# Patient Record
Sex: FEMALE | Race: WHITE | NOT HISPANIC OR LATINO
[De-identification: names, ages, dates, MRNs, and addresses within clinical notes are randomized per-mention and may not be internally consistent; named-entity substitution may affect disease eponyms.]

---

## 2017-09-08 ENCOUNTER — TRANSCRIPTION ENCOUNTER (OUTPATIENT)
Age: 82
End: 2017-09-08

## 2017-09-08 ENCOUNTER — INPATIENT (INPATIENT)
Facility: HOSPITAL | Age: 82
LOS: 0 days | Discharge: HOME | DRG: 310 | End: 2017-09-08
Attending: INTERNAL MEDICINE | Admitting: HOSPITALIST
Payer: MEDICAID

## 2017-09-08 VITALS
HEIGHT: 63 IN | HEART RATE: 133 BPM | WEIGHT: 80.03 LBS | SYSTOLIC BLOOD PRESSURE: 127 MMHG | OXYGEN SATURATION: 98 % | TEMPERATURE: 98 F | DIASTOLIC BLOOD PRESSURE: 80 MMHG | RESPIRATION RATE: 16 BRPM

## 2017-09-08 VITALS
TEMPERATURE: 98 F | HEART RATE: 86 BPM | DIASTOLIC BLOOD PRESSURE: 72 MMHG | OXYGEN SATURATION: 94 % | SYSTOLIC BLOOD PRESSURE: 120 MMHG | RESPIRATION RATE: 20 BRPM

## 2017-09-08 DIAGNOSIS — I48.91 UNSPECIFIED ATRIAL FIBRILLATION: ICD-10-CM

## 2017-09-08 DIAGNOSIS — Z96.659 PRESENCE OF UNSPECIFIED ARTIFICIAL KNEE JOINT: Chronic | ICD-10-CM

## 2017-09-08 DIAGNOSIS — E03.9 HYPOTHYROIDISM, UNSPECIFIED: ICD-10-CM

## 2017-09-08 DIAGNOSIS — Z98.890 OTHER SPECIFIED POSTPROCEDURAL STATES: Chronic | ICD-10-CM

## 2017-09-08 DIAGNOSIS — M19.90 UNSPECIFIED OSTEOARTHRITIS, UNSPECIFIED SITE: ICD-10-CM

## 2017-09-08 DIAGNOSIS — Z96.649 PRESENCE OF UNSPECIFIED ARTIFICIAL HIP JOINT: Chronic | ICD-10-CM

## 2017-09-08 LAB
ALBUMIN SERPL ELPH-MCNC: 3.5 G/DL — SIGNIFICANT CHANGE UP (ref 3.3–5.2)
ALP SERPL-CCNC: 73 U/L — SIGNIFICANT CHANGE UP (ref 40–120)
ALT FLD-CCNC: 20 U/L — SIGNIFICANT CHANGE UP
ANION GAP SERPL CALC-SCNC: 15 MMOL/L — SIGNIFICANT CHANGE UP (ref 5–17)
APTT BLD: 36.4 SEC — SIGNIFICANT CHANGE UP (ref 27.5–37.4)
AST SERPL-CCNC: 24 U/L — SIGNIFICANT CHANGE UP
BASOPHILS NFR BLD AUTO: 1 % — SIGNIFICANT CHANGE UP (ref 0–2)
BILIRUB SERPL-MCNC: 0.6 MG/DL — SIGNIFICANT CHANGE UP (ref 0.4–2)
BUN SERPL-MCNC: 21 MG/DL — HIGH (ref 8–20)
CALCIUM SERPL-MCNC: 8.7 MG/DL — SIGNIFICANT CHANGE UP (ref 8.6–10.2)
CHLORIDE SERPL-SCNC: 95 MMOL/L — LOW (ref 98–107)
CO2 SERPL-SCNC: 24 MMOL/L — SIGNIFICANT CHANGE UP (ref 22–29)
CREAT SERPL-MCNC: 0.73 MG/DL — SIGNIFICANT CHANGE UP (ref 0.5–1.3)
EOSINOPHIL NFR BLD AUTO: 2 % — SIGNIFICANT CHANGE UP (ref 0–5)
GLUCOSE SERPL-MCNC: 155 MG/DL — HIGH (ref 70–115)
HCT VFR BLD CALC: 38.3 % — SIGNIFICANT CHANGE UP (ref 37–47)
HGB BLD-MCNC: 12.9 G/DL — SIGNIFICANT CHANGE UP (ref 12–16)
LYMPHOCYTES # BLD AUTO: 4 % — LOW (ref 20–55)
MCHC RBC-ENTMCNC: 32.1 PG — HIGH (ref 27–31)
MCHC RBC-ENTMCNC: 33.7 G/DL — SIGNIFICANT CHANGE UP (ref 32–36)
MCV RBC AUTO: 95.3 FL — SIGNIFICANT CHANGE UP (ref 81–99)
MONOCYTES NFR BLD AUTO: 10 % — SIGNIFICANT CHANGE UP (ref 3–10)
NEUTROPHILS NFR BLD AUTO: 77 % — HIGH (ref 37–73)
NEUTS BAND # BLD: 2 % — SIGNIFICANT CHANGE UP (ref 0–8)
PLAT MORPH BLD: SIGNIFICANT CHANGE UP
PLATELET # BLD AUTO: 258 K/UL — SIGNIFICANT CHANGE UP (ref 150–400)
POTASSIUM SERPL-MCNC: 3.8 MMOL/L — SIGNIFICANT CHANGE UP (ref 3.5–5.3)
POTASSIUM SERPL-SCNC: 3.8 MMOL/L — SIGNIFICANT CHANGE UP (ref 3.5–5.3)
PROT SERPL-MCNC: 6.8 G/DL — SIGNIFICANT CHANGE UP (ref 6.6–8.7)
RBC # BLD: 4.02 M/UL — LOW (ref 4.4–5.2)
RBC # FLD: 13 % — SIGNIFICANT CHANGE UP (ref 11–15.6)
RBC BLD AUTO: NORMAL — SIGNIFICANT CHANGE UP
SODIUM SERPL-SCNC: 134 MMOL/L — LOW (ref 135–145)
T4 AB SER-ACNC: 5.4 UG/DL — SIGNIFICANT CHANGE UP (ref 4.5–12)
TSH SERPL-MCNC: 5.29 UIU/ML — HIGH (ref 0.27–4.2)
VARIANT LYMPHS # BLD: 4 % — SIGNIFICANT CHANGE UP (ref 0–6)
WBC # BLD: 6.9 K/UL — SIGNIFICANT CHANGE UP (ref 4.8–10.8)
WBC # FLD AUTO: 6.9 K/UL — SIGNIFICANT CHANGE UP (ref 4.8–10.8)

## 2017-09-08 PROCEDURE — 99239 HOSP IP/OBS DSCHRG MGMT >30: CPT

## 2017-09-08 PROCEDURE — 99291 CRITICAL CARE FIRST HOUR: CPT

## 2017-09-08 PROCEDURE — 99223 1ST HOSP IP/OBS HIGH 75: CPT

## 2017-09-08 PROCEDURE — 99053 MED SERV 10PM-8AM 24 HR FAC: CPT

## 2017-09-08 PROCEDURE — 93010 ELECTROCARDIOGRAM REPORT: CPT

## 2017-09-08 RX ORDER — RIVAROXABAN 15 MG-20MG
20 KIT ORAL EVERY 24 HOURS
Qty: 0 | Refills: 0 | Status: DISCONTINUED | OUTPATIENT
Start: 2017-09-08 | End: 2017-09-08

## 2017-09-08 RX ORDER — ENOXAPARIN SODIUM 100 MG/ML
40 INJECTION SUBCUTANEOUS ONCE
Qty: 0 | Refills: 0 | Status: COMPLETED | OUTPATIENT
Start: 2017-09-08 | End: 2017-09-08

## 2017-09-08 RX ORDER — RIVAROXABAN 15 MG-20MG
1 KIT ORAL
Qty: 30 | Refills: 0
Start: 2017-09-08 | End: 2017-10-08

## 2017-09-08 RX ORDER — DILTIAZEM HCL 120 MG
1 CAPSULE, EXT RELEASE 24 HR ORAL
Qty: 30 | Refills: 0 | OUTPATIENT
Start: 2017-09-08 | End: 2017-10-08

## 2017-09-08 RX ORDER — SODIUM CHLORIDE 9 MG/ML
3 INJECTION INTRAMUSCULAR; INTRAVENOUS; SUBCUTANEOUS ONCE
Qty: 0 | Refills: 0 | Status: COMPLETED | OUTPATIENT
Start: 2017-09-08 | End: 2017-09-08

## 2017-09-08 RX ORDER — LEVOTHYROXINE SODIUM 125 MCG
1 TABLET ORAL
Qty: 30 | Refills: 0
Start: 2017-09-08 | End: 2017-10-08

## 2017-09-08 RX ORDER — RIVAROXABAN 15 MG-20MG
1 KIT ORAL
Qty: 30 | Refills: 0 | OUTPATIENT
Start: 2017-09-08 | End: 2017-10-08

## 2017-09-08 RX ORDER — ASPIRIN/CALCIUM CARB/MAGNESIUM 324 MG
0 TABLET ORAL
Qty: 0 | Refills: 0 | COMMUNITY

## 2017-09-08 RX ORDER — RIVAROXABAN 15 MG-20MG
1 KIT ORAL
Qty: 0 | Refills: 0 | COMMUNITY
Start: 2017-09-08

## 2017-09-08 RX ORDER — LEVOTHYROXINE SODIUM 125 MCG
1 TABLET ORAL
Qty: 30 | Refills: 0 | OUTPATIENT
Start: 2017-09-08 | End: 2017-10-08

## 2017-09-08 RX ORDER — ASPIRIN/CALCIUM CARB/MAGNESIUM 324 MG
1 TABLET ORAL
Qty: 30 | Refills: 0 | OUTPATIENT
Start: 2017-09-08 | End: 2017-10-08

## 2017-09-08 RX ORDER — SODIUM CHLORIDE 9 MG/ML
2000 INJECTION INTRAMUSCULAR; INTRAVENOUS; SUBCUTANEOUS ONCE
Qty: 0 | Refills: 0 | Status: COMPLETED | OUTPATIENT
Start: 2017-09-08 | End: 2017-09-08

## 2017-09-08 RX ORDER — DILTIAZEM HCL 120 MG
1 CAPSULE, EXT RELEASE 24 HR ORAL
Qty: 30 | Refills: 0
Start: 2017-09-08 | End: 2017-10-08

## 2017-09-08 RX ADMIN — RIVAROXABAN 20 MILLIGRAM(S): KIT at 18:44

## 2017-09-08 RX ADMIN — SODIUM CHLORIDE 3 MILLILITER(S): 9 INJECTION INTRAMUSCULAR; INTRAVENOUS; SUBCUTANEOUS at 04:33

## 2017-09-08 RX ADMIN — Medication 750 MILLIGRAM(S): at 11:29

## 2017-09-08 RX ADMIN — ENOXAPARIN SODIUM 40 MILLIGRAM(S): 100 INJECTION SUBCUTANEOUS at 05:32

## 2017-09-08 RX ADMIN — SODIUM CHLORIDE 2000 MILLILITER(S): 9 INJECTION INTRAMUSCULAR; INTRAVENOUS; SUBCUTANEOUS at 04:56

## 2017-09-08 RX ADMIN — Medication 750 MILLIGRAM(S): at 12:32

## 2017-09-08 NOTE — DISCHARGE NOTE ADULT - MEDICATION SUMMARY - MEDICATIONS TO TAKE
I will START or STAY ON the medications listed below when I get home from the hospital:    naproxen 250 mg oral tablet  -- 1 tab(s) by mouth every 8 hours, As needed, Hand Pain  -- Indication: For Arthritis    Cardizem  mg/24 hours oral capsule, extended release  -- 1 cap(s) by mouth once a day  -- It is very important that you take or use this exactly as directed.  Do not skip doses or discontinue unless directed by your doctor.  Some non-prescription drugs may aggravate your condition.  Read all labels carefully.  If a warning appears, check with your doctor before taking.  Swallow whole.  Do not crush.    -- Indication: For Atrial fibrillation    rivaroxaban 20 mg oral tablet  -- 1 tab(s) by mouth once a day  -- Check with your doctor before becoming pregnant.  It is very important that you take or use this exactly as directed.  Do not skip doses or discontinue unless directed by your doctor.  Obtain medical advice before taking any non-prescription drugs as some may affect the action of this medication.  Take with food.    -- Indication: For Atrial fibrillation    Synthroid 50 mcg (0.05 mg) oral tablet  -- 1 tab(s) by mouth once a day  -- It is very important that you take or use this exactly as directed.  Do not skip doses or discontinue unless directed by your doctor.  Medication should be taken with plenty of water.  Some non-prescription drugs may aggravate your condition.  Read all labels carefully.  If a warning appears, check with your doctor before taking.  Take medication on an empty stomach 1 hour before or 2 to 3 hours after a meal unless otherwise directed by your doctor.    -- Indication: For Hypothyroidism, unspecified type

## 2017-09-08 NOTE — ED ADULT NURSE REASSESSMENT NOTE - REASSESS COMMUNICATION
assumed care of pt awake isn stretcher daughter at bedside, pt only complaint is of pain to fingers, as per family pt does not have any real medical hx except complaint of pain to fingers, fingers are swollen,,pt to be admitted for a-fib, as per previous staff, pt came in with rapid afib and resolved after medicated.

## 2017-09-08 NOTE — DISCHARGE NOTE ADULT - PATIENT PORTAL LINK FT
“You can access the Follow[facility] Patient Portal, offered by [facility], by registering with the following website: [email or web address]”

## 2017-09-08 NOTE — ED ADULT NURSE NOTE - OBJECTIVE STATEMENT
Pt a&ox3 BIBA c.o pain to Right hand and neck, pt family @ bedside reports they called EMS because pt became weak and diaphoretic with labored breathing. RR even and unlabored @ this time, rapid a-fib on cm, vitals per flowsheet, Pt a&ox3 BIBA c.o pain to Right hand and neck, pt family @ bedside reports they called EMS because pt became weak and diaphoretic with labored breathing @ home this AM. RR even and unlabored @ this time, lungs CTA diminished beena. [place], Rapid a-fib on cm, vitals per flowsheet. MAEx4,  #20G iv noted to Right AC, site asymp. Pt with hx of arthritis, contracture to Left shoulder per baseline, per family @ bedside pt cannot lift Left arm above head. Pt Updated on POC, in no apparent distress, will continue to monitor and reassess

## 2017-09-08 NOTE — H&P ADULT - NSHPPHYSICALEXAM_GEN_ALL_CORE
GEN - appears age appropriate. pleasant. no distress.   HEENT - NCAT, EOMI, PAM  RESP - CTA BL, no wheeze/stridor/rhonchi/crackles. not on supplemental O2.  CARDIO - NS1S2, RRR. No murmurs/rubs/gallops.  ABD - Soft/Non tender/Non distended. Normal BS x4 quadrants.   Ext - No ELIJAH. no signs of venous/arterial stasis ulcers  MSK - full ROM of BL upper and lower extremities without pain or restriction. BL 5/5 strength on upper and lower extremities. joint erythema without warmth or tenderness noted at the DIP of the rt 2nd and 3rd digit. Bouchards and heberdens nodes appreciated BL.   Neuro - cn 2-12 grossly intact. no visible seizure activity appreciated. no tremor. gait not observed.   Skin - clean, dry, intact. no rashes or lesions.    Psych- AAOx3. no suicidal/homicidal ideation. appropriate behaviour. attentive. normal affect.

## 2017-09-08 NOTE — ED PROVIDER NOTE - OBJECTIVE STATEMENT
87 YO F WITH SOB., GENERAL MALAISE. SX ARE CONSTANT FOR LAST FEW DAYS. PT SX STARTED SLOWLY AND GOT WORSE. NO H/O SIMILAR SX. PT DENIES N/V. NP CP

## 2017-09-08 NOTE — ED ADULT NURSE NOTE - CHIEF COMPLAINT QUOTE
patient arrives via ambulance - states she has been complaining of hand and arm pain from her arthritis and is now complaining of neck pain at this time. emilia is hard of hearing

## 2017-09-08 NOTE — ED PROVIDER NOTE - PHYSICAL EXAMINATION
VS: reviewed in triage note...  HEENT: NC/AT , dry membranes  CV:irregularly irregular rate  Lungs: cta b/l, no r/r/w  Abd: soft, nt/nd, +bs  EXT: no c/c/e  Neuro:no focal defecits  skin: no rash  Pulses: dpp, pt 2+ b/l

## 2017-09-08 NOTE — DISCHARGE NOTE ADULT - PLAN OF CARE
control new heart condition start taking Cardizem 120mg daily for heart rhythm control  start taking Xarelto 20mg daily to help prevent having a stroke  Follow up with the cardiologist within 1-2 weeks for a follow up Pain Control Take Naproxen as needed for Pain  Follow up with your PMD, you can consider having an XRay of the hand done and some blood work to evaluate gout at that time pain control as above for Arthritis control of thyroid function Start taking Synthroid 50mcg by mouth daily  Repeat TSH in 6 weeks and have your PMD review the results to change dose as needed

## 2017-09-08 NOTE — DISCHARGE NOTE ADULT - CARE PLAN
Principal Discharge DX:	Atrial fibrillation with rapid ventricular response  Goal:	control new heart condition  Instructions for follow-up, activity and diet:	start taking Cardizem 120mg daily for heart rhythm control  start taking Xarelto 20mg daily to help prevent having a stroke  Follow up with the cardiologist within 1-2 weeks for a follow up  Secondary Diagnosis:	Arthritis  Goal:	Pain Control  Instructions for follow-up, activity and diet:	Take Naproxen as needed for Pain  Follow up with your PMD, you can consider having an XRay of the hand done and some blood work to evaluate gout at that time  Secondary Diagnosis:	Joint contracture  Goal:	pain control  Instructions for follow-up, activity and diet:	as above for Arthritis  Secondary Diagnosis:	Hypothyroidism, unspecified type  Goal:	control of thyroid function  Instructions for follow-up, activity and diet:	Start taking Synthroid 50mcg by mouth daily  Repeat TSH in 6 weeks and have your PMD review the results to change dose as needed

## 2017-09-08 NOTE — H&P ADULT - PROBLEM SELECTOR PLAN 3
- acute flare on right digit of pain  - believe that the patient may have had mild trauma that she does not remember  - gout unlikely  - pseudogout is also a possibility  PLAN  - naproxen PRN for pain  - will not check urate now because it is often falsely low in acute gout flares anyway, should be repeated as an outpatient when she is well to help establish a dx  - she may also consider XR hand as an outpatient for further eval

## 2017-09-08 NOTE — H&P ADULT - NSHPATTENDINGPLANDISCUSS_GEN_ALL_CORE
the patient and her daughter.  All imaging and results of lab/other studies reviewed by me. All questions answered to the satisfaction of the patient. At this time she agrees with the current plan of therapy.

## 2017-09-08 NOTE — H&P ADULT - FAMILY HISTORY
Father  Still living? Unknown  Family history of myocardial infarction, Age at diagnosis: Age Unknown     Mother  Still living? Unknown  Family history of myocardial infarction, Age at diagnosis: Age Unknown

## 2017-09-08 NOTE — DISCHARGE NOTE ADULT - MEDICATION SUMMARY - MEDICATIONS TO STOP TAKING
I will STOP taking the medications listed below when I get home from the hospital:    aspirin  --  by mouth once a day, As Needed for joint pain    Aspir 81 oral delayed release tablet  -- 1 tab(s) by mouth once a day    aspirin  --  by mouth once a day, As Needed pain

## 2017-09-08 NOTE — DISCHARGE NOTE ADULT - CARE PROVIDER_API CALL
Calvin Ellison (MD), Internal Medicine  [address] [place] [id number]  Phone: [phone]  Fax: [phone]    Carlos Retana (SULMA; MPH), Internal Medicine  [address] [place] [id number]  Phone: [phone]  Fax: [phone]

## 2017-09-08 NOTE — ED PROVIDER NOTE - OTHER FINDINGS
rapid a-fib , normal qrs, t wave flattening, normal qtc NSR, normal CT, Q waves in v1-v3, t wave flattening, change normal qtc

## 2017-09-08 NOTE — CONSULT NOTE ADULT - SUBJECTIVE AND OBJECTIVE BOX
[facility], THE [facility]ART [facility]                                   [address] [place] [address]                                                      PHONE: [phone]                                                         FAX: [phone]  -------------------------------------------------------------------------------------------------------------------------------    86y Female with past medical history as under arrived via ambulance - states she has been complaining of hand and arm pain from her arthritis and is now complaining of neck pain at this time. Daughter at bedside reports that she looked pale, diaphoretic and appears clammy. She was brought to ED and noted to have AF with RVR. She received iv cardizem and subsequently converted to NSR. No prior cardiac history. Does not recall being told of AF before. Pt follows with Dr. Ellison. At the time of evaluation, pt denies any chest pain, palpitations, dizziness, lightheadedness. She normally ambulates at home and has had no recent falls.     PAST MEDICAL & SURGICAL HISTORY:  Joint contracture: Left shoulder  Frequent falls  Arthritis  History of hip replacement: Left  History of knee replacement: Right      No Known Allergies      Review of Systems:   Positive for palpitations, shortness of breath  Rest of the systems were reviewed and was negative.     Family history reviewed and non-contributory    Social History:  No smoking   No alcohol  No other drug use    Vital Signs Last 24 Hrs  T(C): 37.2 (08 Sep 2017 03:31), Max: 37.2 (08 Sep 2017 03:31)  T(F): 99 (08 Sep 2017 03:31), Max: 99 (08 Sep 2017 03:31)  HR: 85 (08 Sep 2017 07:43) (85 - 144)  BP: 112/63 (08 Sep 2017 07:43) (109/63 - 127/80)  BP(mean): --  RR: 20 (08 Sep 2017 07:43) (16 - 20)  SpO2: 98% (08 Sep 2017 07:43) (97% - 98%)    PHYSICAL EXAM:  Constitutional: Oriented to time, place and person. Appears well developed, well nourished; alert and co-operative  HEENT:     Conjunctiva normal, Normal oral mucosa, No JVD	  Cardiovascular: Normal S1 S2, No murmurs  Respiratory: Lungs clear to auscultation; no crepitations, no wheeze  Gastrointestinal:  Soft, Non-tender, + BS	  Extremities: No cyanosis, clubbing or edema  Skin: Warm and dry  Neurologic: Alert oriented to time place and person  Psychiatric: affect was normal        LABS:                        12.9   6.9   )-----------( 258      ( 08 Sep 2017 04:01 )             38.3     09-08    134<L>  |  95<L>  |  21.0<H>  ----------------------------<  155<H>  3.8   |  24.0  |  0.73    Ca    8.7      08 Sep 2017 04:01    TPro  6.8  /  Alb  3.5  /  TBili  0.6  /  DBili  x   /  AST  24  /  ALT  20  /  AlkPhos  73  09-08    CARDIAC MARKERS ( 08 Sep 2017 04:01 )  x     / 0.01 ng/mL / x     / x     / x          PTT - ( 08 Sep 2017 04:03 )  PTT:36.4 sec    RADIOLOGY & ADDITIONAL STUDIES:    CARDIOLOGY TESTING:     ECG: AF with RVR with septal infarct. Subsequent ECG shows NSR with anteroseptal infarct    MEDICATIONS:  MEDICATIONS  (STANDING):    MEDICATIONS  (PRN):      ASSESSMENT AND PLAN:    86y Female with no prior history of HTN, DM, or any cardiac history who presented with palpitations, appearing clammy and was found to have AF with RVR. Now converted to NSR and feels well.    -  Pt back in NSR- presentation likely due to episode of symptomatic AF.   -  Will check Echo given anteroseptal infarct noted on ECG.  -  Pt will likely benefit from long term anticoagulation given CHADs-Vasc score>2. Discussed with pt and daughter in detail. After understanding risk and benefits including absence of reversal agent, they agree to be on NOACs. Will opt for Xarelto 20 mg daily  -  Add Cardizem- mg daily  -  If echo without significant abnormalities can be discharged from cardiac standpoint. Will arrange outpt FU [facility], [facility]                                   [address] [place] [address]                                                      PHONE: [phone]                                                         FAX: [phone]  -------------------------------------------------------------------------------------------------------------------------------    86y Female with past medical history as under arrived via ambulance - states she has been complaining of hand and arm pain from her arthritis and is now complaining of neck pain at this time. Daughter at bedside reports that she looked pale, diaphoretic and appears clammy. She was brought to ED and noted to have AF with RVR. She received iv cardizem and subsequently converted to NSR. No prior cardiac history. Does not recall being told of AF before. Pt follows with Dr. Ellison. At the time of evaluation, pt denies any chest pain, palpitations, dizziness, lightheadedness. She normally ambulates at home and has had no recent falls.     PAST MEDICAL & SURGICAL HISTORY:  Joint contracture: Left shoulder  Frequent falls  Arthritis  History of hip replacement: Left  History of knee replacement: Right      No Known Allergies      Review of Systems:   Positive for palpitations, shortness of breath  Rest of the systems were reviewed and was negative.     Family history reviewed and non-contributory    Social History:  No smoking   No alcohol  No other drug use    Vital Signs Last 24 Hrs  T(C): 37.2 (08 Sep 2017 03:31), Max: 37.2 (08 Sep 2017 03:31)  T(F): 99 (08 Sep 2017 03:31), Max: 99 (08 Sep 2017 03:31)  HR: 85 (08 Sep 2017 07:43) (85 - 144)  BP: 112/63 (08 Sep 2017 07:43) (109/63 - 127/80)  BP(mean): --  RR: 20 (08 Sep 2017 07:43) (16 - 20)  SpO2: 98% (08 Sep 2017 07:43) (97% - 98%)    PHYSICAL EXAM:  Constitutional: Oriented to time, place and person. Appears well developed, well nourished; alert and co-operative  HEENT:     Conjunctiva normal, Normal oral mucosa, No JVD	  Cardiovascular: Normal S1 S2, No murmurs  Respiratory: Lungs clear to auscultation; no crepitations, no wheeze  Gastrointestinal:  Soft, Non-tender, + BS	  Extremities: No cyanosis, clubbing or edema  Skin: Warm and dry, right middle finger red   Neurologic: Alert oriented to time place and person  Psychiatric: affect was normal        LABS:                        12.9   6.9   )-----------( 258      ( 08 Sep 2017 04:01 )             38.3     09-08    134<L>  |  95<L>  |  21.0<H>  ----------------------------<  155<H>  3.8   |  24.0  |  0.73    Ca    8.7      08 Sep 2017 04:01    TPro  6.8  /  Alb  3.5  /  TBili  0.6  /  DBili  x   /  AST  24  /  ALT  20  /  AlkPhos  73  09-08    CARDIAC MARKERS ( 08 Sep 2017 04:01 )  x     / 0.01 ng/mL / x     / x     / x          PTT - ( 08 Sep 2017 04:03 )  PTT:36.4 sec    RADIOLOGY & ADDITIONAL STUDIES:    CARDIOLOGY TESTING:     ECG: AF with RVR with septal infarct. Subsequent ECG shows NSR with anteroseptal infarct    MEDICATIONS:  MEDICATIONS  (STANDING):    MEDICATIONS  (PRN):      ASSESSMENT AND PLAN:    86y Female with no prior history of HTN, DM, or any cardiac history who presented with palpitations, appearing clammy and was found to have AF with RVR. Now converted to NSR and feels well.    -  Pt back in NSR- presentation likely due to episode of symptomatic AF.   -  Will check Echo given anteroseptal infarct noted on ECG.  -  Pt will likely benefit from long term anticoagulation given CHADs-Vasc score>2. Discussed with pt and daughter in detail. After understanding risk and benefits including absence of reversal agent, they agree to be on NOACs. Will opt for Xarelto 20 mg daily  -  Add Cardizem- mg daily  -  If echo without significant abnormalities can be discharged from cardiac standpoint. Will arrange outpt FU

## 2017-09-08 NOTE — DISCHARGE NOTE ADULT - OTHER SIGNIFICANT FINDINGS
Course was complicated by swelling of the 2nd and 3rd DIP on the Rt hand. After evaluation not determined to be secondary to infectious cause, most likely an acute exacerbation of arthritis from possible trauma or from CPPD. Evaluation for gout not recommended given acuity. Patient advised to have further work up with XR and serum urate as an outpatient. Given pain control with Naproxen in the meantime.

## 2017-09-08 NOTE — H&P ADULT - PROBLEM SELECTOR PLAN 1
- no signs of infection as spark to cause conversion into afib  - trop neg x1  PLAN  - check additional trops x2 to definitively rule out underlying ACS, next @ 10:00 - no signs of infection as spark to cause conversion into afib  - trop neg x1  - hypothyroidism mild but apparent on lab work up  - returned into SR with rate control sp Cardizem 20mg IV and 60mg po doses without reentry into AFib  - sp 1x weight based dose of Lovenox  PLAN  - clinically stable  - check echo then discharge  - cardiology consult appreciated, will discharge with outpatient follow up on cardizem and Xarelto for stroke ppx  - outpt cardio follow up  - PMD office contacted and made aware

## 2017-09-08 NOTE — H&P ADULT - NSHPREVIEWOFSYSTEMS_GEN_ALL_CORE
She denies fevers, chills, poor appetite, altered mental status, LOC, seizure, headache, lightheadedness, admits to dizziness. Denies nasal congestion, dysphagia/odynophagia, chest pain, palpitations, shortness of breath, cough, wheezing/stridor, abdominal pain/cramping, nausea, vomiting, diarrhea, constipation, hematochezia/melena. Denies abnormal bleeding. Admits to 2 day hx of pain at the distal rt 2nd and 3rd digits but cannot recall whether there was trauma involved.    Also denies recent travel, recent sick contacts. Denies recent medication changes    Rectal bleeding as per HPI negative and all other ROS reviewed and negative.

## 2017-09-08 NOTE — H&P ADULT - PROBLEM SELECTOR PLAN 2
- new onset  - noted to have very mild elevation of TSH  - possible that this may have contributed to new onset AFib  PLAN  - start synthroid 50mcg prescription  - repeat TSH as an outpatient in 6wks and titrate as needed

## 2017-09-08 NOTE — H&P ADULT - HISTORY OF PRESENT ILLNESS
86yoF from home with PMH Arthritis presenting with her daughter for complaints of feeling unwell. The patient is AAOx3 but reported not feeling ill at all except for mild arthritic hand pain, much of the history provided by her daughter at the bedside. The patient reportedly has "not been feeling herself" for the entire summer. Daughter reports that she has had a decrease in exercise tolerance, has not been able to keep up with her normal activities, and has become more faint than usual. She also reports recently within the past 2 days noting excessive diaphoresis and noting that her mother appeared to be "ill" prompting her to bring her to the hospital for evaluation. She has no similar prior episodes and was feeling well before this summer began.

## 2017-09-08 NOTE — H&P ADULT - ATTENDING COMMENTS
At this time the patient is in a hemodynamic state that requires hospital level of care/treatment. This has been explained to the patient and they are agreeable to the terms of admittance and continued care.     Length of Admission: 75min

## 2017-09-08 NOTE — DISCHARGE NOTE ADULT - HOSPITAL COURSE
86yoF with PMH Arthritis presenting with symptomatic new onset AFib noted on EKG. She was given Cardizem both po and IV and converted back into NSR where she remained asymptomatic and comfortable. Cardiology consultation was placed and recommendations appreciated. Patient was bridged from weight based Lovenox to Xarelto for DVT prophylaxis and Cardizem oral for continued rate control. Lab work up revealed mild hypothyroidism for which she is being started on Synthroid. Echocardiogram was done which was largely unremarkable.

## 2017-09-08 NOTE — ED ADULT NURSE REASSESSMENT NOTE - NS ED NURSE REASSESS COMMENT FT1
Pt baseline mental status, NSR on cm, pt with 3x attempt to use bedpan, pt states "I feel like I need to pee but I cant", MD Devan zimmerman, MD to order denis for comfort @ this time. Pt and family updated on POC, will continue to monitor and reassess

## 2017-09-13 LAB
CULTURE RESULTS: SIGNIFICANT CHANGE UP
CULTURE RESULTS: SIGNIFICANT CHANGE UP
SPECIMEN SOURCE: SIGNIFICANT CHANGE UP
SPECIMEN SOURCE: SIGNIFICANT CHANGE UP

## 2017-10-19 PROCEDURE — 84436 ASSAY OF TOTAL THYROXINE: CPT

## 2017-10-19 PROCEDURE — 83605 ASSAY OF LACTIC ACID: CPT

## 2017-10-19 PROCEDURE — 96374 THER/PROPH/DIAG INJ IV PUSH: CPT

## 2017-10-19 PROCEDURE — 87040 BLOOD CULTURE FOR BACTERIA: CPT

## 2017-10-19 PROCEDURE — 85730 THROMBOPLASTIN TIME PARTIAL: CPT

## 2017-10-19 PROCEDURE — 93306 TTE W/DOPPLER COMPLETE: CPT

## 2017-10-19 PROCEDURE — 93005 ELECTROCARDIOGRAM TRACING: CPT

## 2017-10-19 PROCEDURE — 99291 CRITICAL CARE FIRST HOUR: CPT | Mod: 25

## 2017-10-19 PROCEDURE — 85027 COMPLETE CBC AUTOMATED: CPT

## 2017-10-19 PROCEDURE — 84443 ASSAY THYROID STIM HORMONE: CPT

## 2017-10-19 PROCEDURE — 84484 ASSAY OF TROPONIN QUANT: CPT

## 2017-10-19 PROCEDURE — 96372 THER/PROPH/DIAG INJ SC/IM: CPT | Mod: XU

## 2017-10-19 PROCEDURE — 36415 COLL VENOUS BLD VENIPUNCTURE: CPT

## 2017-10-19 PROCEDURE — 80053 COMPREHEN METABOLIC PANEL: CPT

## 2018-07-28 ENCOUNTER — TRANSCRIPTION ENCOUNTER (OUTPATIENT)
Age: 83
End: 2018-07-28

## 2018-07-28 ENCOUNTER — EMERGENCY (EMERGENCY)
Facility: HOSPITAL | Age: 83
LOS: 1 days | Discharge: HOME | End: 2018-07-28
Attending: EMERGENCY MEDICINE
Payer: MEDICARE

## 2018-07-28 VITALS
DIASTOLIC BLOOD PRESSURE: 77 MMHG | HEART RATE: 69 BPM | RESPIRATION RATE: 17 BRPM | TEMPERATURE: 98 F | SYSTOLIC BLOOD PRESSURE: 130 MMHG | OXYGEN SATURATION: 100 %

## 2018-07-28 VITALS — HEIGHT: 65 IN | WEIGHT: 89.95 LBS

## 2018-07-28 DIAGNOSIS — Z96.649 PRESENCE OF UNSPECIFIED ARTIFICIAL HIP JOINT: Chronic | ICD-10-CM

## 2018-07-28 DIAGNOSIS — Z98.890 OTHER SPECIFIED POSTPROCEDURAL STATES: Chronic | ICD-10-CM

## 2018-07-28 DIAGNOSIS — Z96.659 PRESENCE OF UNSPECIFIED ARTIFICIAL KNEE JOINT: Chronic | ICD-10-CM

## 2018-07-28 PROBLEM — M19.90 UNSPECIFIED OSTEOARTHRITIS, UNSPECIFIED SITE: Chronic | Status: ACTIVE | Noted: 2017-09-08

## 2018-07-28 PROBLEM — M24.50 CONTRACTURE, UNSPECIFIED JOINT: Chronic | Status: ACTIVE | Noted: 2017-09-08

## 2018-07-28 LAB
ALBUMIN SERPL ELPH-MCNC: 4.1 G/DL — SIGNIFICANT CHANGE UP (ref 3.3–5.2)
ALP SERPL-CCNC: 99 U/L — SIGNIFICANT CHANGE UP (ref 40–120)
ALT FLD-CCNC: 11 U/L — SIGNIFICANT CHANGE UP
ANION GAP SERPL CALC-SCNC: 13 MMOL/L — SIGNIFICANT CHANGE UP (ref 5–17)
APPEARANCE UR: CLEAR — SIGNIFICANT CHANGE UP
AST SERPL-CCNC: 16 U/L — SIGNIFICANT CHANGE UP
BASOPHILS # BLD AUTO: 0 K/UL — SIGNIFICANT CHANGE UP (ref 0–0.2)
BASOPHILS NFR BLD AUTO: 0.2 % — SIGNIFICANT CHANGE UP (ref 0–2)
BILIRUB SERPL-MCNC: 0.5 MG/DL — SIGNIFICANT CHANGE UP (ref 0.4–2)
BILIRUB UR-MCNC: NEGATIVE — SIGNIFICANT CHANGE UP
BUN SERPL-MCNC: 19 MG/DL — SIGNIFICANT CHANGE UP (ref 8–20)
CALCIUM SERPL-MCNC: 9 MG/DL — SIGNIFICANT CHANGE UP (ref 8.6–10.2)
CHLORIDE SERPL-SCNC: 99 MMOL/L — SIGNIFICANT CHANGE UP (ref 98–107)
CO2 SERPL-SCNC: 26 MMOL/L — SIGNIFICANT CHANGE UP (ref 22–29)
COLOR SPEC: YELLOW — SIGNIFICANT CHANGE UP
CREAT SERPL-MCNC: 0.53 MG/DL — SIGNIFICANT CHANGE UP (ref 0.5–1.3)
DIFF PNL FLD: ABNORMAL
EOSINOPHIL # BLD AUTO: 0.1 K/UL — SIGNIFICANT CHANGE UP (ref 0–0.5)
EOSINOPHIL NFR BLD AUTO: 1.4 % — SIGNIFICANT CHANGE UP (ref 0–6)
EPI CELLS # UR: SIGNIFICANT CHANGE UP
GLUCOSE SERPL-MCNC: 100 MG/DL — SIGNIFICANT CHANGE UP (ref 70–115)
GLUCOSE UR QL: NEGATIVE MG/DL — SIGNIFICANT CHANGE UP
HCT VFR BLD CALC: 41.3 % — SIGNIFICANT CHANGE UP (ref 37–47)
HGB BLD-MCNC: 13.3 G/DL — SIGNIFICANT CHANGE UP (ref 12–16)
KETONES UR-MCNC: NEGATIVE — SIGNIFICANT CHANGE UP
LEUKOCYTE ESTERASE UR-ACNC: ABNORMAL
LYMPHOCYTES # BLD AUTO: 1.2 K/UL — SIGNIFICANT CHANGE UP (ref 1–4.8)
LYMPHOCYTES # BLD AUTO: 18.5 % — LOW (ref 20–55)
MCHC RBC-ENTMCNC: 31.1 PG — HIGH (ref 27–31)
MCHC RBC-ENTMCNC: 32.2 G/DL — SIGNIFICANT CHANGE UP (ref 32–36)
MCV RBC AUTO: 96.5 FL — SIGNIFICANT CHANGE UP (ref 81–99)
MONOCYTES # BLD AUTO: 0.8 K/UL — SIGNIFICANT CHANGE UP (ref 0–0.8)
MONOCYTES NFR BLD AUTO: 13 % — HIGH (ref 3–10)
NEUTROPHILS # BLD AUTO: 4.2 K/UL — SIGNIFICANT CHANGE UP (ref 1.8–8)
NEUTROPHILS NFR BLD AUTO: 66.7 % — SIGNIFICANT CHANGE UP (ref 37–73)
NITRITE UR-MCNC: NEGATIVE — SIGNIFICANT CHANGE UP
PH UR: 8 — SIGNIFICANT CHANGE UP (ref 5–8)
PLATELET # BLD AUTO: 267 K/UL — SIGNIFICANT CHANGE UP (ref 150–400)
POTASSIUM SERPL-MCNC: 4.2 MMOL/L — SIGNIFICANT CHANGE UP (ref 3.5–5.3)
POTASSIUM SERPL-SCNC: 4.2 MMOL/L — SIGNIFICANT CHANGE UP (ref 3.5–5.3)
PROT SERPL-MCNC: 6.9 G/DL — SIGNIFICANT CHANGE UP (ref 6.6–8.7)
PROT UR-MCNC: 30 MG/DL
RBC # BLD: 4.28 M/UL — LOW (ref 4.4–5.2)
RBC # FLD: 13.1 % — SIGNIFICANT CHANGE UP (ref 11–15.6)
RBC CASTS # UR COMP ASSIST: SIGNIFICANT CHANGE UP /HPF (ref 0–4)
SODIUM SERPL-SCNC: 138 MMOL/L — SIGNIFICANT CHANGE UP (ref 135–145)
SP GR SPEC: 1.01 — SIGNIFICANT CHANGE UP (ref 1.01–1.02)
UROBILINOGEN FLD QL: NEGATIVE MG/DL — SIGNIFICANT CHANGE UP
WBC # BLD: 6.3 K/UL — SIGNIFICANT CHANGE UP (ref 4.8–10.8)
WBC # FLD AUTO: 6.3 K/UL — SIGNIFICANT CHANGE UP (ref 4.8–10.8)
WBC UR QL: SIGNIFICANT CHANGE UP

## 2018-07-28 PROCEDURE — 93010 ELECTROCARDIOGRAM REPORT: CPT

## 2018-07-28 PROCEDURE — 81001 URINALYSIS AUTO W/SCOPE: CPT

## 2018-07-28 PROCEDURE — 96360 HYDRATION IV INFUSION INIT: CPT

## 2018-07-28 PROCEDURE — 70450 CT HEAD/BRAIN W/O DYE: CPT | Mod: 26

## 2018-07-28 PROCEDURE — 85027 COMPLETE CBC AUTOMATED: CPT

## 2018-07-28 PROCEDURE — 96361 HYDRATE IV INFUSION ADD-ON: CPT

## 2018-07-28 PROCEDURE — 70450 CT HEAD/BRAIN W/O DYE: CPT

## 2018-07-28 PROCEDURE — 36415 COLL VENOUS BLD VENIPUNCTURE: CPT

## 2018-07-28 PROCEDURE — 84443 ASSAY THYROID STIM HORMONE: CPT

## 2018-07-28 PROCEDURE — 93005 ELECTROCARDIOGRAM TRACING: CPT

## 2018-07-28 PROCEDURE — 99284 EMERGENCY DEPT VISIT MOD MDM: CPT

## 2018-07-28 PROCEDURE — 80053 COMPREHEN METABOLIC PANEL: CPT

## 2018-07-28 PROCEDURE — 84484 ASSAY OF TROPONIN QUANT: CPT

## 2018-07-28 PROCEDURE — 99284 EMERGENCY DEPT VISIT MOD MDM: CPT | Mod: 25

## 2018-07-28 PROCEDURE — 87086 URINE CULTURE/COLONY COUNT: CPT

## 2018-07-28 RX ORDER — SODIUM CHLORIDE 9 MG/ML
500 INJECTION INTRAMUSCULAR; INTRAVENOUS; SUBCUTANEOUS ONCE
Qty: 0 | Refills: 0 | Status: COMPLETED | OUTPATIENT
Start: 2018-07-28 | End: 2018-07-28

## 2018-07-28 RX ORDER — SODIUM CHLORIDE 9 MG/ML
250 INJECTION INTRAMUSCULAR; INTRAVENOUS; SUBCUTANEOUS ONCE
Qty: 0 | Refills: 0 | Status: DISCONTINUED | OUTPATIENT
Start: 2018-07-28 | End: 2018-07-28

## 2018-07-28 RX ADMIN — SODIUM CHLORIDE 500 MILLILITER(S): 9 INJECTION INTRAMUSCULAR; INTRAVENOUS; SUBCUTANEOUS at 19:41

## 2018-07-28 RX ADMIN — SODIUM CHLORIDE 1000 MILLILITER(S): 9 INJECTION INTRAMUSCULAR; INTRAVENOUS; SUBCUTANEOUS at 18:53

## 2018-07-28 NOTE — ED ADULT NURSE REASSESSMENT NOTE - TEMPLATE LIST FOR HEAD TO TOE ASSESSMENT
General Surgery Consult      Reji Cabrera  Admit date: (Not on file)    MRN: [id number]     : 1967     Age: 52 y.o. Attending Physician: Sundeep Zafar MD, [facility]      History of Present Illness:     Reji Cabrera is a 52 y.o. male was referred for evaluation of a symptomatic umbilical hernia. He had the hernia for years but it is increasing in size. It is causing discomfort and some pain. He also has constipation. He is morbidly obese and he has gained a lot of weight recently. Pain is dull. The mass is  not reducible. He does not have a history of incarceration or obstruction. The patient also gives a history of constipation. Patient does not have a history of  previous hernia surgery. There are no active problems to display for this patient. Past Medical History:   Diagnosis Date    Hypertension       Past Surgical History:   Procedure Laterality Date    HX ORTHOPAEDIC      arm-left      Social History   Substance Use Topics    Smoking status: Former Smoker    Smokeless tobacco: Never Used    Alcohol use Yes      Comment: Occationally- every thursday      History   Smoking Status    Former Smoker   Smokeless Tobacco    Never Used     Family History   Problem Relation Age of Onset    No Known Problems Mother     No Known Problems Father       Current Outpatient Prescriptions   Medication Sig    lisinopril-hydroCHLOROthiazide (PRINZIDE, ZESTORETIC) 10-12.5 mg per tablet Take 1 Tab by mouth every morning. No current facility-administered medications for this visit.        No Known Allergies     Review of Systems:  Constitutional: negative  Eyes: negative  Ears, Nose, Mouth, Throat, and Face: negative  Respiratory: negative  Cardiovascular: negative  Gastrointestinal: positive for abdominal pain  Genitourinary:negative  Integument/Breast: negative  Hematologic/Lymphatic: negative  Musculoskeletal:negative  Neurological: negative  Behavioral/Psychiatric: negative    Objective:     Visit Vitals    BP (!) 177/97 (BP 1 Location: Right arm, BP Patient Position: Sitting)    Pulse 85    Temp 95.9 °F (35.5 °C) (Oral)    Resp 20    Ht 6' 3\" (1.905 m)    Wt (!) 213.2 kg (470 lb)    SpO2 97%    BMI 58.75 kg/m2       Physical Exam:      General:  in no apparent distress and well developed and well nourished   Eyes:  conjunctivae and sclerae normal, pupils equal, round, reactive to light   Throat & Neck: no erythema or exudates noted and neck supple and symmetrical; no palpable masses   Lungs:   clear to auscultation bilaterally   Heart:  Regular rate and rhythm   Abdomen:   obese and protuberant, soft, nontender, nondistended, no masses or organomegaly. Large umbilical hernia that is slightly tender and partially reducible. Extremities: extremities normal, atraumatic, no cyanosis or edema   Skin: Normal.       Imaging and Lab Review:     CBC: No results found for: WBC, RBC, HGB, HCT, PLT, HGBEXT, HCTEXT, PLTEXT  BMP: No results found for: GLU, NA, K, CL, CO2, BUN, CREA, CA  CMP:No results found for: GLU, NA, K, CL, CO2, BUN, CREA, CA, AGAP, BUCR, TBIL, GPT, AP, TP, ALB, GLOB, AGRAT    No results found for this or any previous visit (from the past 24 hour(s)). images and reports reviewed    Assessment:   Reji Cabrera is a 52 y.o. male is presenting with a picture of a symptomatic umbilical hernia. I Discussed the possibility of incarceration, strangulation, enlargement in size over time, and the risk of emergency surgery in the face of strangulation. I also discussed the use of prosthetic materials (mesh), including the risk of infection. Also discussed the risk of surgery including recurrence and the possible need for reoperation and removal of mesh if used, possibility of postoperative small bowel injury, obstruction or ileus, and the risks of general anesthetic. I explained to the the patient about the robotic hernia repair procedure.   I had a long Neuro discussion with the patient, and I explained to him that it is better if he underwent bariatric evaluation for possible bariatric surgery before his hernia. He was reluctant but after discussion he said he is okay with evaluating it. Plan:     Refer to my one of my partners for evaluation for bariatric surgery. Follow up with me as needed for the hernia depending on the bariatric evaluation.       Please call me if you have any questions (cell phone: [phone])     Signed By: Marlee Agosto MD     April 19, 2017

## 2018-07-28 NOTE — ED ADULT NURSE REASSESSMENT NOTE - NS ED NURSE REASSESS COMMENT FT1
Pt axox3 eager for DC, at time of DC patient denies dizziness is able to ambulate with walker and denies any issues. Pt states she feels better after fluids and is comfortable with DC

## 2018-07-28 NOTE — ED PROVIDER NOTE - OBJECTIVE STATEMENT
88 y/o with PMHX of AFIB and hypothyroidism presenting with her daughters for dizziness x 1 day and headache x 5 days. Daughter states her mother doesn't turn on the Air conditioner in the home or drink enough water. She is here from urgent care for possible dehydration

## 2018-07-28 NOTE — ED ADULT NURSE NOTE - OBJECTIVE STATEMENT
Pt axox3 c/o headache x 3 days and dizziness. Family at bedside states that patient does not like to use AC at home and thinks she may be dehydrated. Pt denies recent falls. Family states they noticed she needs more help then usual.

## 2018-07-28 NOTE — ED PROVIDER NOTE - ATTENDING CONTRIBUTION TO CARE
Pt. well appearing. No distress. Lungs are clear. Abdomen soft/NT. I have discussed the plan with the ACP.

## 2018-07-29 LAB
CULTURE RESULTS: SIGNIFICANT CHANGE UP
SPECIMEN SOURCE: SIGNIFICANT CHANGE UP

## 2019-04-12 NOTE — DISCHARGE NOTE ADULT - PROVIDER TOKENS
Outpatient Rehabilitation - Wound/Debridement Treatment Note   Nancy     Patient Name: Tereza Ackerman  : 1953  MRN: [id number]  Today's Date: 2019                 Admit Date: 2019    Visit Dx:    ICD-10-CM ICD-9-CM   1. Open wound of abdomen, subsequent encounter S31.109D V58.89     879.2       Patient Active Problem List   Diagnosis   • Impaired glucose tolerance   • Parathyroid adenoma   • Reaction to chronic stress   • Multinodular goiter   • Vitamin D deficiency   • Meningioma, recurrent of brain (CMS/HCC)   • Arthritis   • Depression   • Small bowel obstruction (CMS/HCC)   • Insomnia   • History of Nissen fundoplication   • Malignant neoplasm of left orbit (CMS/HCC)   • Prediabetes   • Mixed hyperlipidemia   • Hyperglycemia   • Atrial flutter with rapid ventricular response (CMS/HCC)   • Anemia   • Large bowel obstruction (CMS/HCC)   • Abdominal pain   • Essential hypertension   • History of creation of ostomy (CMS/HCC)   • Screen for colon cancer   • Sigmoid volvulus (CMS/HCC)        Past Medical History:   Diagnosis Date   • Arthritis     rt knee    • Atrial flutter with rapid ventricular response (CMS/HCC) 2017   • Back pain    • Brain tumor (CMS/HCC)    • Colostomy present (CMS/HCC) 2018   • Depression    • History of blood transfusion    • History of gastroesophageal reflux (GERD)     resolved since Nissen   • History of Nissen fundoplication 2017   • History of small bowel obstruction    • Hyperlipemia    • Hypertension    • Memory loss or impairment    • Migraine    • Parathyroid adenoma     Incidental on thyroid US.   • PONV (postoperative nausea and vomiting)     nausea - preprocedural meds help    • Prediabetes     Last Impression: 2015  Reviewed labs. Excellent control.  Emery Connell Impression: 2015  Reviewed labs. Excellent control.  Michaela Connell   • Thyroid nodule      Last Impression: 2015  r/o thyroid cancer, will  proceed with US.  Michaela Connell (Internal Medicine)    • UTI (urinary tract infection)    • Vision changes     blockages left eye    • Wears glasses     readers        Past Surgical History:   Procedure Laterality Date   • CARPAL TUNNEL RELEASE  2002    right    • COLONOSCOPY N/A 8/18/2018    Procedure: COLONOSCOPY;  Surgeon: Inderjit Campos MD;  Location:  [facility];  Service: Gastroenterology   • COLONOSCOPY N/A 11/28/2018    Procedure: COLONOSCOPY;  Surgeon: Inderjit Campos MD;  Location:  [facility];  Service: Gastroenterology   • COLOSTOMY CLOSURE N/A 2/19/2019    Procedure: COLOSTOMY TAKEDOWN, INCISIONAL HERNIA REPAIR;  Surgeon: Andrea Bocanegra MD;  Location:  [facility];  Service: General   • CRANIOTOMY  2003 & 2014    Dr. Werner Hollis for tumor removal   • ENDOSCOPY     • EXPLORATORY LAPAROTOMY N/A 12/5/2017    Procedure: EXPLORATORY LAPAROTOMY, SMALL BOWEL RESECTION;  Surgeon: Ирина Cobian MD;  Location:  [facility];  Service:    • EXPLORATORY LAPAROTOMY N/A 12/22/2017    Procedure: LAPAROTOMY EXPLORATORY FOR SMALL BOWEL OBSTRUCTION;  Surgeon: Ирина Cobian MD;  Location:  [facility];  Service:    • EXPLORATORY LAPAROTOMY N/A 7/23/2018    Procedure: EXPLORATORY LAPAROTOMY, APPENDECTOMY, CECOPEXY, INCISIONAL HERNIA REPAIR, LYSIS OF ADHESIONS;  Surgeon: Andrea Bocanegra MD;  Location:  [facility];  Service: General   • EXPLORATORY LAPAROTOMY N/A 8/19/2018    Procedure: LAPAROTOMY EXPLORATORY, SIGMOID COLECTOMY, CREATION OF OSTOMY;  Surgeon: Andrea Bocanegra MD;  Location:  [facility];  Service: General   • HYSTERECTOMY      partial - both ovaries still present pt believes    • INSERTION HEMODIALYSIS CATHETER N/A 12/7/2017    Procedure: HEMODIALYSIS CATHETER INSERTION;  Surgeon: Chance Valenzuela MD;  Location:  [facility];  Service:    • ORBITOTOMY Left 11/3/2017    Procedure:  LEFT LATERAL ORBITOTOMY WITH DEBULKING OF TUMOR ;  Surgeon: Moo Hopkins MD;  Location:  [facility];  Service:    • OTHER SURGICAL HISTORY      esophagogastric fundoplasty nissen fundoplication   • TUBAL ABDOMINAL LIGATION           EVALUATION  PT Ortho     Row Name 04/12/19 1015       Subjective Comments    Subjective Comments  Pt without complaint. MD pleased with progress  -ES       Subjective Pain    Able to rate subjective pain?  yes  -ES    Pre-Treatment Pain Level  0  -ES    Post-Treatment Pain Level  0  -ES       Transfers    Sit-Stand Luna (Transfers)  independent  -ES    Stand-Sit Luna (Transfers)  independent  -ES    Comment (Transfers)  supine for tx  -ES       Gait/Stairs Assessment/Training    Luna Level (Gait)  independent  -ES      User Key  (r) = Recorded By, (t) = Taken By, (c) = Cosigned By    Initials Name Provider Type    ES Jaimie Dueñas, PT Physical Therapist          LDA Wound     Row Name 04/12/19 1015             Wound 02/28/19 1400 midline abdomen surgical    Wound - Properties Group Date first assessed: 02/28/19  - Time first assessed: 1400  - Orientation: midline  - Location: abdomen  - Type: surgical  -MF    Dressing Appearance  moist drainage;intact  -ES      Base  clean;granulating;moist;red;yellow;slough;epithelialization  -ES      Periwound  intact;pink  -ES      Periwound Temperature  warm  -ES      Periwound Skin Turgor  soft  -ES      Edges  open  -ES      Wound Length (cm)  2 cm  -ES      Wound Width (cm)  0.7 cm  -ES      Wound Depth (cm)  -- hypergranulation at inferior aspect  -ES      Tunneling [Depth (cm)/Location]  0  -ES      Drainage Characteristics/Odor  serosanguineous  -ES      Drainage Amount  scant  -ES      Care, Wound  irrigated with;wound cleanser;debrided;silver agent applied Ag nitrate to hypergranulation  -ES      Dressing Care, Wound  dressing applied;petroleum-based;gauze;low-adherent;foam xeroform, 4\"opti  -ES         Wound 02/28/19 1400 Left lateral abdomen surgical    Wound - Properties Group Date first assessed: " 02/28/19  - Time first assessed: 1400  -MF Side: Left  -MF Orientation: lateral  -MF Location: abdomen  - Type: surgical  -MF    Base  closed/resurfaced  -ES      Dressing Care, Wound  low-adherent;foam  -ES         NPWT (Negative Pressure Wound Therapy) 02/28/19 1400 midline and lateral wound    NPWT (Negative Pressure Wound Therapy) - Properties Group Placement Date: 02/28/19  - Placement Time: 1400  -MF Location: midline and lateral wound  -MF      User Key  (r) = Recorded By, (t) = Taken By, (c) = Cosigned By    Initials Name Provider Type    MF Alli Fields, PT Physical Therapist    ES Jaimie Dueñas, PT Physical Therapist            WOUND DEBRIDEMENT  Total area of Debridement: 2cmsq  Debridement Site 1  Location- Site 1: abd wounds  Selective Debridement- Site 1: Wound Surface <20cmsq  Instruments- Site 1: tweezers  Excised Tissue Description- Site 1: scant, slough  Bleeding- Site 1: seeping, held pressure, 1 minute             Therapy Education     Row Name 04/12/19 1015             Therapy Education    Education Details  use of Ag nitrate on hypergranulation  -ES      Given  Symptoms/condition management;Bandaging/dressing change  -ES      Program  Modified  -ES      How Provided  Verbal;Demonstration  -ES      Provided to  Caregiver;Patient  -ES      Level of Understanding  Verbalized;Demonstrated  -ES        User Key  (r) = Recorded By, (t) = Taken By, (c) = Cosigned By    Initials Name Provider Type    Jaimie Lynn, PT Physical Therapist          Recommendation and Plan  PT Assessment/Plan     Row Name 04/12/19 1015          PT Assessment    Functional Limitations  Other (comment) wound care  -ES     Impairments  Integumentary integrity  -ES     Assessment Comments  superior aspect of midline incision closed with small bump of hypergranulation at inferior end. Ag nitrate applied in attempts to slow growth to allow for epithelial tissue to grow over. Lateral incision healed with cotinued  fragile tissue warranting protection with dressing. Will F/U next week with patient home dressing change inbetween. Hopefully able to D/C next session  -ES        PT Plan    Physical Therapy Interventions (Optional Details)  patient/family education;wound care  -ES     PT Plan Comments  Ag nitrate to hypergranulation PRN, advanced dressing management, ?D/C  -ES       User Key  (r) = Recorded By, (t) = Taken By, (c) = Cosigned By    Initials Name Provider Type    ES Jaimie Dueñas, PT Physical Therapist               Time Calculation: Start Time: 1015  Therapy Charges for Today     Code Description Service Date Service Provider Modifiers Qty    [id number]  GILA DEBRIDE OPEN WOUND UP TO 20CM 4/12/2019 Jaimie Dueñas, PT GP 1                  Jaimie Dueñas, CORNELIO  4/12/2019      TOKEN:'[id number]',TOKEN:'[id number]'

## 2022-01-23 ENCOUNTER — INPATIENT (INPATIENT)
Facility: HOSPITAL | Age: 87
LOS: 12 days | Discharge: NURSING, REHAB OR LONG-TERM CARE | DRG: 177 | End: 2022-02-05
Attending: FAMILY MEDICINE | Admitting: HOSPITALIST
Payer: MEDICARE

## 2022-01-23 VITALS
HEIGHT: 65 IN | DIASTOLIC BLOOD PRESSURE: 78 MMHG | TEMPERATURE: 98 F | OXYGEN SATURATION: 96 % | SYSTOLIC BLOOD PRESSURE: 152 MMHG | HEART RATE: 87 BPM | RESPIRATION RATE: 22 BRPM

## 2022-01-23 DIAGNOSIS — Z96.649 PRESENCE OF UNSPECIFIED ARTIFICIAL HIP JOINT: Chronic | ICD-10-CM

## 2022-01-23 DIAGNOSIS — Z96.659 PRESENCE OF UNSPECIFIED ARTIFICIAL KNEE JOINT: Chronic | ICD-10-CM

## 2022-01-23 DIAGNOSIS — J18.9 PNEUMONIA, UNSPECIFIED ORGANISM: ICD-10-CM

## 2022-01-23 DIAGNOSIS — Z98.890 OTHER SPECIFIED POSTPROCEDURAL STATES: Chronic | ICD-10-CM

## 2022-01-23 LAB
ALBUMIN SERPL ELPH-MCNC: 2.9 G/DL — LOW (ref 3.3–5.2)
ALP SERPL-CCNC: 79 U/L — SIGNIFICANT CHANGE UP (ref 40–120)
ALT FLD-CCNC: 24 U/L — SIGNIFICANT CHANGE UP
ANION GAP SERPL CALC-SCNC: 12 MMOL/L — SIGNIFICANT CHANGE UP (ref 5–17)
AST SERPL-CCNC: 43 U/L — HIGH
BASOPHILS # BLD AUTO: 0.01 K/UL — SIGNIFICANT CHANGE UP (ref 0–0.2)
BASOPHILS NFR BLD AUTO: 0.2 % — SIGNIFICANT CHANGE UP (ref 0–2)
BILIRUB SERPL-MCNC: 0.3 MG/DL — LOW (ref 0.4–2)
BUN SERPL-MCNC: 16.2 MG/DL — SIGNIFICANT CHANGE UP (ref 8–20)
CALCIUM SERPL-MCNC: 8.3 MG/DL — LOW (ref 8.6–10.2)
CHLORIDE SERPL-SCNC: 100 MMOL/L — SIGNIFICANT CHANGE UP (ref 98–107)
CO2 SERPL-SCNC: 28 MMOL/L — SIGNIFICANT CHANGE UP (ref 22–29)
CREAT SERPL-MCNC: 0.58 MG/DL — SIGNIFICANT CHANGE UP (ref 0.5–1.3)
D DIMER BLD IA.RAPID-MCNC: 278 NG/ML DDU — HIGH
EOSINOPHIL # BLD AUTO: 0 K/UL — SIGNIFICANT CHANGE UP (ref 0–0.5)
EOSINOPHIL NFR BLD AUTO: 0 % — SIGNIFICANT CHANGE UP (ref 0–6)
GLUCOSE SERPL-MCNC: 121 MG/DL — HIGH (ref 70–99)
HCT VFR BLD CALC: 41.9 % — SIGNIFICANT CHANGE UP (ref 34.5–45)
HGB BLD-MCNC: 13.6 G/DL — SIGNIFICANT CHANGE UP (ref 11.5–15.5)
IMM GRANULOCYTES NFR BLD AUTO: 0.6 % — SIGNIFICANT CHANGE UP (ref 0–1.5)
LYMPHOCYTES # BLD AUTO: 0.51 K/UL — LOW (ref 1–3.3)
LYMPHOCYTES # BLD AUTO: 9.7 % — LOW (ref 13–44)
MCHC RBC-ENTMCNC: 30.1 PG — SIGNIFICANT CHANGE UP (ref 27–34)
MCHC RBC-ENTMCNC: 32.5 GM/DL — SIGNIFICANT CHANGE UP (ref 32–36)
MCV RBC AUTO: 92.7 FL — SIGNIFICANT CHANGE UP (ref 80–100)
MONOCYTES # BLD AUTO: 0.45 K/UL — SIGNIFICANT CHANGE UP (ref 0–0.9)
MONOCYTES NFR BLD AUTO: 8.6 % — SIGNIFICANT CHANGE UP (ref 2–14)
NEUTROPHILS # BLD AUTO: 4.25 K/UL — SIGNIFICANT CHANGE UP (ref 1.8–7.4)
NEUTROPHILS NFR BLD AUTO: 80.9 % — HIGH (ref 43–77)
NT-PROBNP SERPL-SCNC: 1820 PG/ML — HIGH (ref 0–300)
PLATELET # BLD AUTO: 340 K/UL — SIGNIFICANT CHANGE UP (ref 150–400)
POTASSIUM SERPL-MCNC: 3.9 MMOL/L — SIGNIFICANT CHANGE UP (ref 3.5–5.3)
POTASSIUM SERPL-SCNC: 3.9 MMOL/L — SIGNIFICANT CHANGE UP (ref 3.5–5.3)
PROT SERPL-MCNC: 6.1 G/DL — LOW (ref 6.6–8.7)
RAPID RVP RESULT: DETECTED
RBC # BLD: 4.52 M/UL — SIGNIFICANT CHANGE UP (ref 3.8–5.2)
RBC # FLD: 13.4 % — SIGNIFICANT CHANGE UP (ref 10.3–14.5)
SARS-COV-2 RNA SPEC QL NAA+PROBE: DETECTED
SODIUM SERPL-SCNC: 140 MMOL/L — SIGNIFICANT CHANGE UP (ref 135–145)
TROPONIN T SERPL-MCNC: <0.01 NG/ML — SIGNIFICANT CHANGE UP (ref 0–0.06)
WBC # BLD: 5.25 K/UL — SIGNIFICANT CHANGE UP (ref 3.8–10.5)
WBC # FLD AUTO: 5.25 K/UL — SIGNIFICANT CHANGE UP (ref 3.8–10.5)

## 2022-01-23 PROCEDURE — 99223 1ST HOSP IP/OBS HIGH 75: CPT

## 2022-01-23 PROCEDURE — 71045 X-RAY EXAM CHEST 1 VIEW: CPT | Mod: 26

## 2022-01-23 PROCEDURE — 93010 ELECTROCARDIOGRAM REPORT: CPT

## 2022-01-23 PROCEDURE — 99291 CRITICAL CARE FIRST HOUR: CPT

## 2022-01-23 PROCEDURE — 99497 ADVNCD CARE PLAN 30 MIN: CPT | Mod: 25

## 2022-01-23 RX ORDER — DEXAMETHASONE 0.5 MG/5ML
6 ELIXIR ORAL DAILY
Refills: 0 | Status: COMPLETED | OUTPATIENT
Start: 2022-01-23 | End: 2022-02-02

## 2022-01-23 RX ORDER — ACETAMINOPHEN 500 MG
650 TABLET ORAL EVERY 4 HOURS
Refills: 0 | Status: DISCONTINUED | OUTPATIENT
Start: 2022-01-23 | End: 2022-02-05

## 2022-01-23 RX ORDER — DEXAMETHASONE 0.5 MG/5ML
6 ELIXIR ORAL ONCE
Refills: 0 | Status: COMPLETED | OUTPATIENT
Start: 2022-01-23 | End: 2022-01-23

## 2022-01-23 RX ORDER — DILTIAZEM HCL 120 MG
120 CAPSULE, EXT RELEASE 24 HR ORAL DAILY
Refills: 0 | Status: DISCONTINUED | OUTPATIENT
Start: 2022-01-23 | End: 2022-01-31

## 2022-01-23 RX ORDER — AZITHROMYCIN 500 MG/1
500 TABLET, FILM COATED ORAL ONCE
Refills: 0 | Status: COMPLETED | OUTPATIENT
Start: 2022-01-23 | End: 2022-01-23

## 2022-01-23 RX ORDER — REMDESIVIR 5 MG/ML
INJECTION INTRAVENOUS
Refills: 0 | Status: COMPLETED | OUTPATIENT
Start: 2022-01-23 | End: 2022-01-28

## 2022-01-23 RX ORDER — LEVOTHYROXINE SODIUM 125 MCG
50 TABLET ORAL DAILY
Refills: 0 | Status: DISCONTINUED | OUTPATIENT
Start: 2022-01-23 | End: 2022-02-05

## 2022-01-23 RX ORDER — GUAIFENESIN/DEXTROMETHORPHAN 600MG-30MG
10 TABLET, EXTENDED RELEASE 12 HR ORAL EVERY 4 HOURS
Refills: 0 | Status: DISCONTINUED | OUTPATIENT
Start: 2022-01-23 | End: 2022-02-05

## 2022-01-23 RX ORDER — CEFTRIAXONE 500 MG/1
1000 INJECTION, POWDER, FOR SOLUTION INTRAMUSCULAR; INTRAVENOUS ONCE
Refills: 0 | Status: COMPLETED | OUTPATIENT
Start: 2022-01-23 | End: 2022-01-23

## 2022-01-23 RX ORDER — REMDESIVIR 5 MG/ML
200 INJECTION INTRAVENOUS EVERY 24 HOURS
Refills: 0 | Status: COMPLETED | OUTPATIENT
Start: 2022-01-23 | End: 2022-01-24

## 2022-01-23 RX ORDER — ALBUTEROL 90 UG/1
2 AEROSOL, METERED ORAL EVERY 4 HOURS
Refills: 0 | Status: DISCONTINUED | OUTPATIENT
Start: 2022-01-23 | End: 2022-02-05

## 2022-01-23 RX ORDER — ENOXAPARIN SODIUM 100 MG/ML
40 INJECTION SUBCUTANEOUS DAILY
Refills: 0 | Status: DISCONTINUED | OUTPATIENT
Start: 2022-01-23 | End: 2022-02-05

## 2022-01-23 RX ADMIN — AZITHROMYCIN 255 MILLIGRAM(S): 500 TABLET, FILM COATED ORAL at 21:30

## 2022-01-23 RX ADMIN — CEFTRIAXONE 100 MILLIGRAM(S): 500 INJECTION, POWDER, FOR SOLUTION INTRAMUSCULAR; INTRAVENOUS at 21:03

## 2022-01-23 RX ADMIN — Medication 6 MILLIGRAM(S): at 18:53

## 2022-01-23 NOTE — H&P ADULT - HISTORY OF PRESENT ILLNESS
89 y/o female with PMH of AFIB (no longer on Xarelto due to risk of fall), hypothyroid came to the ED for shortness of breath. Patient is very hard of hearing, HPI obtained from son (Kobi via phone). As per son, patient has not been in her normal state (which is very agile, A/O x3, able to do ADLs) in the last 4 days. She has shortness of breath, fever, cough, HA; family have been trying to manage her but today, she was hypoxic to 83% on RA which prompted ED visit. Of note, son said his sister who the patient lives with had covid 2 weeks ago. Patient was not checked for covid, and she is not vaccinated against covid. No nausea, vomiting, abdominal pain, change in bowel/urinary habit, chest pain, fall reported.

## 2022-01-23 NOTE — ED ADULT TRIAGE NOTE - CHIEF COMPLAINT QUOTE
Pt arrives with c/o body aches, general weakness and was noted to be hypoxic upon EMS arrival to home, improved with NRB en route. Pt states no chest pain and mild SOB at this time.

## 2022-01-23 NOTE — H&P ADULT - ASSESSMENT
89 y/o female with PMH of AFIB (no longer on Xarelto due to risk of fall), hypothyroid came to the ED for shortness of breath. Patient is very hard of hearing, HPI obtained from son (Kobi via phone). As per son, patient has not been in her normal state (which is very agile, A/O x3, able to do ADLs) in the last 4 days. She has shortness of breath, fever, cough, HA; family have been trying to manage her but today, she was hypoxic to 83% on RA which prompted ED visit. Of note, son said his sister who the patient lives with had covid 2 weeks ago.     As per ED attending and documentation, patient's daughter is requesting stem cell treatment, does not want Remdesivir and requesting for patient to be transferred to [facility]. I spoke to patient's son Kobi Schneider who is the health care proxy; told him about the treatment for covid and he said to treat patient with Remdesivir and continue Decadron.   Patient received antibiotic in the ED for possible bacteria pna pending covid report; will hold off on antibiotic for now and continue management for covid, if no improvement, will resume antibiotic.     Acute respiratory failure with hypoxia due to covid pna  Admit to medical floor with    Decadron given in the ED, will continue   Will start Remdesivir as requested by son   Tylenol PRN for fever/pain   Isolation protocol   Oxygen therapy as tolerated     Afib   Not on AC due to high risk of fall   Diltiazem 120mg with holding parameters     Hypothyroidism   Synthroid 50mcg     Supportive   DVT prophylaxis: Lovenox   Diet: DASH   Code: FULL    Plan of care discussed with patient's son Kobi ([phone]). Please call in AM for updates.

## 2022-01-23 NOTE — ED PROVIDER NOTE - ATTENDING CONTRIBUTION TO CARE
I, Erickson Sher, personally saw the patient with the resident, and completed the key components of the history and physical exam. I then discussed the management plan with the resident.    89 yo F hx of hypothyroid, afib on cardizime but not on AC, sent in from home for sob, fever, headache x 4 days. patient had covid exposure at home. Patient hypoxic to 87% on RA and improved on NR. mild tachypnea. diffuse crackles. irregular HR, abdomen soft, no leg edema. Patient upset that patient didn't go to [facility] and wanted her transferred to [facility]. Transfer center report it will take 24-72 hrs. Family is agreeable to be treated here. will get blood work, decadron, cxr. will need admission for respiratory distress and hypoxia. I, Erickson Sher, personally saw the patient with the resident, and completed the key components of the history and physical exam. I then discussed the management plan with the resident.    Upon my evaluation, this patient had a high probability of imminent or life-threatening deterioration due to respiratory failure, which required my direct attention, intervention, and personal management.    89 yo F hx of hypothyroid, afib on cardizime but not on AC, sent in from home for sob, fever, headache x 4 days. patient had covid exposure at home. Patient hypoxic to 87% on RA and improved on NR. mild tachypnea. diffuse crackles. irregular HR, abdomen soft, no leg edema. Patient upset that patient didn't go to [facility] and wanted her transferred to [facility]. Transfer center report it will take 24-72 hrs. Family is agreeable to be treated here. will get blood work, decadron, cxr. will need admission for respiratory distress and hypoxia.    I have personally provided _40  minutes of critical care time exculsive of time spent on separately billable procedures.  Time includes review of laboratory data, radiology results, discussion with consultants, and monitoring for potential decompensation.  Interventions were performed as documented.

## 2022-01-23 NOTE — H&P ADULT - NSICDXPASTSURGICALHX_GEN_ALL_CORE_FT
PAST SURGICAL HISTORY:  History of hip replacement Left    History of knee replacement Right    S/P wrist surgery Right

## 2022-01-23 NOTE — ED PROVIDER NOTE - PROGRESS NOTE DETAILS
Spoke with daughter who is upset that patient did not go to [facility].  She has strong opinions about COVID based on her research online, does not want Remdesivir, wants STEM cell treatments.    Explained we will do initial workup in ED and if patient is admitted then she can talk with hospitalist further about tx options, she is ok for Dexamethasone.  Daughter's name is Laura Shaikh [phone].  Patient's cardiologist is Dr. Retana. Patent received in sign-out pending labs for admission for covid PNA with hypoxia. Labs are as noted. CXR re-viewed. Patient will need IV contrast study to r/o PE.    Patient re-evaluated and found to be resting comfortably on NRB 95%. Patient transitioned to NC and sat-ing 93%. However, once patient fell asleep her sat dropped to 88% on 6L. Hi charlie will be ordered. Patent received in sign-out pending labs for admission for covid PNA with hypoxia. Labs are as noted. CXR re-viewed. Patient will need IV contrast study to r/o PE.    Patient re-evaluated and found to be resting comfortably on NRB 95%. Patient transitioned to NC and sat-ing 93% -96% on 6L. Hospitalist requesting ct angio of chest to assess patient pulmonary disease given cxr findings.

## 2022-01-23 NOTE — ED CLERICAL - DIVISION
----- Message from Brooklyn Garner MD sent at 9/2/2021  8:49 AM CDT -----  Thyroid is too over supplemented.  Recommend reducing thyroid medication to 1 tab Sunday through Friday and 1/2 tab on Saturdays (update med list). Recheck tsh then in 4-8 weeks please.  Remainder of labs look good  Brooklyn Garner MD    
Called and left message for Felix to return call for lab result.  
Felix returned call.  Informed her that her labs showed:  Thyroid is too over supplemented.  Recommend reducing thyroid medication to 1 tab Sunday through Friday and 1/2 tab on Saturdays.   Recheck tsh then in 4-8 weeks.  Lab order placed.  Remainder of labs look good.  Patient verbalized understanding.       
[facility]

## 2022-01-23 NOTE — ED PROVIDER NOTE - CLINICAL SUMMARY MEDICAL DECISION MAKING FREE TEXT BOX
89F hx hypothyroid, AFIB on Cardizem (not on AC due to fall risk), hearing impaired, presents from home after 4 days fevers, SOB, HA, cough and recent COVID exposure.  Patient hypoxic to 80s, placed on nonrebreather.   EKG labs and imaging performed to evaluate the patient.

## 2022-01-23 NOTE — ED PROVIDER NOTE - OBJECTIVE STATEMENT
89F hx hypothyroid, AFIB on Cardizem (not on AC due to fall risk), hearing impaired, presents from home after 4 days fevers, SOB, HA, cough and recent COVID exposure.  Patient lives at home and cared for by family.  Patient denies symptoms, minimally communicative.

## 2022-01-23 NOTE — ED PROVIDER NOTE - PHYSICAL EXAMINATION
General: NAD, eldelry appearing  HEENT: Normocephalic, atraumatic  Neck: No apparent stiffness or JVD  Pulm: Chest wall symmetric and nontender, lungs clear to ascultation   Cardiac: Regular rate and regular rhythm  Abdomen: Nontender and nondistended  Skin: Skin is warm, dry and intact without rashes or lesions.  Neuro: No motor or sensory deficits above reported baseline  MSK: No deformity or tenderness above reported baseline

## 2022-01-24 PROBLEM — I48.91 UNSPECIFIED ATRIAL FIBRILLATION: Chronic | Status: ACTIVE | Noted: 2018-07-28

## 2022-01-24 LAB
A1C WITH ESTIMATED AVERAGE GLUCOSE RESULT: 6.3 % — HIGH (ref 4–5.6)
ANION GAP SERPL CALC-SCNC: 14 MMOL/L — SIGNIFICANT CHANGE UP (ref 5–17)
APPEARANCE UR: CLEAR — SIGNIFICANT CHANGE UP
BILIRUB UR-MCNC: NEGATIVE — SIGNIFICANT CHANGE UP
BUN SERPL-MCNC: 20.5 MG/DL — HIGH (ref 8–20)
CALCIUM SERPL-MCNC: 8.4 MG/DL — LOW (ref 8.6–10.2)
CHLORIDE SERPL-SCNC: 99 MMOL/L — SIGNIFICANT CHANGE UP (ref 98–107)
CO2 SERPL-SCNC: 28 MMOL/L — SIGNIFICANT CHANGE UP (ref 22–29)
COLOR SPEC: YELLOW — SIGNIFICANT CHANGE UP
CREAT SERPL-MCNC: 0.58 MG/DL — SIGNIFICANT CHANGE UP (ref 0.5–1.3)
DIFF PNL FLD: NEGATIVE — SIGNIFICANT CHANGE UP
EPI CELLS # UR: SIGNIFICANT CHANGE UP
ESTIMATED AVERAGE GLUCOSE: 134 MG/DL — HIGH (ref 68–114)
GLUCOSE SERPL-MCNC: 157 MG/DL — HIGH (ref 70–99)
GLUCOSE UR QL: NEGATIVE MG/DL — SIGNIFICANT CHANGE UP
HCT VFR BLD CALC: 44.8 % — SIGNIFICANT CHANGE UP (ref 34.5–45)
HGB BLD-MCNC: 14.2 G/DL — SIGNIFICANT CHANGE UP (ref 11.5–15.5)
HYALINE CASTS # UR AUTO: ABNORMAL /LPF
KETONES UR-MCNC: NEGATIVE — SIGNIFICANT CHANGE UP
LEUKOCYTE ESTERASE UR-ACNC: NEGATIVE — SIGNIFICANT CHANGE UP
MCHC RBC-ENTMCNC: 29.8 PG — SIGNIFICANT CHANGE UP (ref 27–34)
MCHC RBC-ENTMCNC: 31.7 GM/DL — LOW (ref 32–36)
MCV RBC AUTO: 94.1 FL — SIGNIFICANT CHANGE UP (ref 80–100)
NITRITE UR-MCNC: NEGATIVE — SIGNIFICANT CHANGE UP
PH UR: 6 — SIGNIFICANT CHANGE UP (ref 5–8)
PLATELET # BLD AUTO: 373 K/UL — SIGNIFICANT CHANGE UP (ref 150–400)
POTASSIUM SERPL-MCNC: 3.7 MMOL/L — SIGNIFICANT CHANGE UP (ref 3.5–5.3)
POTASSIUM SERPL-SCNC: 3.7 MMOL/L — SIGNIFICANT CHANGE UP (ref 3.5–5.3)
PROT UR-MCNC: 30 MG/DL
RBC # BLD: 4.76 M/UL — SIGNIFICANT CHANGE UP (ref 3.8–5.2)
RBC # FLD: 13.3 % — SIGNIFICANT CHANGE UP (ref 10.3–14.5)
RBC CASTS # UR COMP ASSIST: SIGNIFICANT CHANGE UP /HPF (ref 0–4)
SODIUM SERPL-SCNC: 141 MMOL/L — SIGNIFICANT CHANGE UP (ref 135–145)
SP GR SPEC: 1.01 — SIGNIFICANT CHANGE UP (ref 1.01–1.02)
UROBILINOGEN FLD QL: 1 MG/DL
WBC # BLD: 2.41 K/UL — LOW (ref 3.8–10.5)
WBC # FLD AUTO: 2.41 K/UL — LOW (ref 3.8–10.5)
WBC UR QL: SIGNIFICANT CHANGE UP /HPF (ref 0–5)

## 2022-01-24 PROCEDURE — 99233 SBSQ HOSP IP/OBS HIGH 50: CPT

## 2022-01-24 PROCEDURE — 71275 CT ANGIOGRAPHY CHEST: CPT | Mod: 26

## 2022-01-24 RX ORDER — TAMSULOSIN HYDROCHLORIDE 0.4 MG/1
0.4 CAPSULE ORAL ONCE
Refills: 0 | Status: COMPLETED | OUTPATIENT
Start: 2022-01-24 | End: 2022-01-24

## 2022-01-24 RX ORDER — REMDESIVIR 5 MG/ML
100 INJECTION INTRAVENOUS EVERY 24 HOURS
Refills: 0 | Status: COMPLETED | OUTPATIENT
Start: 2022-01-25 | End: 2022-01-27

## 2022-01-24 RX ORDER — ALPRAZOLAM 0.25 MG
0.25 TABLET ORAL ONCE
Refills: 0 | Status: DISCONTINUED | OUTPATIENT
Start: 2022-01-24 | End: 2022-01-24

## 2022-01-24 RX ORDER — TAMSULOSIN HYDROCHLORIDE 0.4 MG/1
0.4 CAPSULE ORAL AT BEDTIME
Refills: 0 | Status: DISCONTINUED | OUTPATIENT
Start: 2022-01-24 | End: 2022-02-05

## 2022-01-24 RX ADMIN — ENOXAPARIN SODIUM 40 MILLIGRAM(S): 100 INJECTION SUBCUTANEOUS at 18:21

## 2022-01-24 RX ADMIN — TAMSULOSIN HYDROCHLORIDE 0.4 MILLIGRAM(S): 0.4 CAPSULE ORAL at 21:03

## 2022-01-24 RX ADMIN — Medication 50 MICROGRAM(S): at 06:31

## 2022-01-24 RX ADMIN — REMDESIVIR 500 MILLIGRAM(S): 5 INJECTION INTRAVENOUS at 00:43

## 2022-01-24 RX ADMIN — Medication 120 MILLIGRAM(S): at 06:31

## 2022-01-24 RX ADMIN — TAMSULOSIN HYDROCHLORIDE 0.4 MILLIGRAM(S): 0.4 CAPSULE ORAL at 18:21

## 2022-01-24 RX ADMIN — Medication 0.25 MILLIGRAM(S): at 22:50

## 2022-01-24 RX ADMIN — REMDESIVIR 500 MILLIGRAM(S): 5 INJECTION INTRAVENOUS at 23:54

## 2022-01-24 RX ADMIN — Medication 6 MILLIGRAM(S): at 06:31

## 2022-01-24 NOTE — PROGRESS NOTE ADULT - ASSESSMENT
89 y/o female with PMH of AFIB (no longer on Xarelto due to risk of fall), hypothyroid came to the ED for shortness of breath. HPI obtained from son (Kobi via phone). As per son, patient has not been in her normal state (which is very agile, A/O x3, able to do ADLs) in the last 4 days. She was hypoxic to 83% on RA which prompted ED visit. Of note, son said his sister who the patient lives with had covid 2 weeks ago.     Acute respiratory failure with hypoxia due to Covid pna  Now off NRB, on NC 6 L  c/w Decadron   c/w Remdesivir  Tylenol PRN for fever/pain   Isolation protocol   Oxygen therapy as tolerated     Afib   Not on AC due to high risk of fall   Diltiazem 120mg with holding parameters     Hypothyroidism   Synthroid 50mcg     Supportive   DVT prophylaxis: Lovenox   Diet: DASH   Code: FULL    Plan of care discussed with patient's son Kobi ([phone])   PT consult in AM

## 2022-01-24 NOTE — ED ADULT NURSE REASSESSMENT NOTE - NS ED NURSE REASSESS COMMENT FT1
Report given to MIGUEL CAMPO Pt Stable at time of report.
Pt resting comfortably, denies complaints at this time. Denies pain. IV fluids running. Bed locked and in lowest position. Call bell within reach. Frequent checks made. Will continue to monitor.
Pt resting comfortably, denies complaints at this time. Denies pain. IV antibiotics running. Bed locked and in lowest position. Call bell within reach. Will continue to monitor.

## 2022-01-25 LAB
ALBUMIN SERPL ELPH-MCNC: 2.8 G/DL — LOW (ref 3.3–5.2)
ALP SERPL-CCNC: 79 U/L — SIGNIFICANT CHANGE UP (ref 40–120)
ALT FLD-CCNC: 29 U/L — SIGNIFICANT CHANGE UP
ANION GAP SERPL CALC-SCNC: 12 MMOL/L — SIGNIFICANT CHANGE UP (ref 5–17)
AST SERPL-CCNC: 35 U/L — HIGH
BASOPHILS # BLD AUTO: 0 K/UL — SIGNIFICANT CHANGE UP (ref 0–0.2)
BASOPHILS NFR BLD AUTO: 0 % — SIGNIFICANT CHANGE UP (ref 0–2)
BILIRUB SERPL-MCNC: 0.3 MG/DL — LOW (ref 0.4–2)
BUN SERPL-MCNC: 41.8 MG/DL — HIGH (ref 8–20)
CALCIUM SERPL-MCNC: 8.6 MG/DL — SIGNIFICANT CHANGE UP (ref 8.6–10.2)
CHLORIDE SERPL-SCNC: 101 MMOL/L — SIGNIFICANT CHANGE UP (ref 98–107)
CO2 SERPL-SCNC: 29 MMOL/L — SIGNIFICANT CHANGE UP (ref 22–29)
CREAT SERPL-MCNC: 0.76 MG/DL — SIGNIFICANT CHANGE UP (ref 0.5–1.3)
CRP SERPL-MCNC: 70 MG/L — HIGH
EOSINOPHIL # BLD AUTO: 0 K/UL — SIGNIFICANT CHANGE UP (ref 0–0.5)
EOSINOPHIL NFR BLD AUTO: 0 % — SIGNIFICANT CHANGE UP (ref 0–6)
FERRITIN SERPL-MCNC: 280 NG/ML — HIGH (ref 15–150)
GLUCOSE SERPL-MCNC: 159 MG/DL — HIGH (ref 70–99)
HCT VFR BLD CALC: 43.3 % — SIGNIFICANT CHANGE UP (ref 34.5–45)
HGB BLD-MCNC: 14 G/DL — SIGNIFICANT CHANGE UP (ref 11.5–15.5)
IMM GRANULOCYTES NFR BLD AUTO: 0.9 % — SIGNIFICANT CHANGE UP (ref 0–1.5)
LYMPHOCYTES # BLD AUTO: 0.55 K/UL — LOW (ref 1–3.3)
LYMPHOCYTES # BLD AUTO: 12.1 % — LOW (ref 13–44)
MCHC RBC-ENTMCNC: 30.1 PG — SIGNIFICANT CHANGE UP (ref 27–34)
MCHC RBC-ENTMCNC: 32.3 GM/DL — SIGNIFICANT CHANGE UP (ref 32–36)
MCV RBC AUTO: 93.1 FL — SIGNIFICANT CHANGE UP (ref 80–100)
MONOCYTES # BLD AUTO: 0.28 K/UL — SIGNIFICANT CHANGE UP (ref 0–0.9)
MONOCYTES NFR BLD AUTO: 6.2 % — SIGNIFICANT CHANGE UP (ref 2–14)
NEUTROPHILS # BLD AUTO: 3.67 K/UL — SIGNIFICANT CHANGE UP (ref 1.8–7.4)
NEUTROPHILS NFR BLD AUTO: 80.8 % — HIGH (ref 43–77)
PLATELET # BLD AUTO: 413 K/UL — HIGH (ref 150–400)
POTASSIUM SERPL-MCNC: 3.8 MMOL/L — SIGNIFICANT CHANGE UP (ref 3.5–5.3)
POTASSIUM SERPL-SCNC: 3.8 MMOL/L — SIGNIFICANT CHANGE UP (ref 3.5–5.3)
PROT SERPL-MCNC: 6.2 G/DL — LOW (ref 6.6–8.7)
RBC # BLD: 4.65 M/UL — SIGNIFICANT CHANGE UP (ref 3.8–5.2)
RBC # FLD: 13.4 % — SIGNIFICANT CHANGE UP (ref 10.3–14.5)
SODIUM SERPL-SCNC: 142 MMOL/L — SIGNIFICANT CHANGE UP (ref 135–145)
WBC # BLD: 4.54 K/UL — SIGNIFICANT CHANGE UP (ref 3.8–10.5)
WBC # FLD AUTO: 4.54 K/UL — SIGNIFICANT CHANGE UP (ref 3.8–10.5)

## 2022-01-25 PROCEDURE — 99233 SBSQ HOSP IP/OBS HIGH 50: CPT

## 2022-01-25 RX ADMIN — Medication 6 MILLIGRAM(S): at 05:35

## 2022-01-25 RX ADMIN — ENOXAPARIN SODIUM 40 MILLIGRAM(S): 100 INJECTION SUBCUTANEOUS at 12:11

## 2022-01-25 RX ADMIN — REMDESIVIR 500 MILLIGRAM(S): 5 INJECTION INTRAVENOUS at 23:52

## 2022-01-25 RX ADMIN — TAMSULOSIN HYDROCHLORIDE 0.4 MILLIGRAM(S): 0.4 CAPSULE ORAL at 22:27

## 2022-01-25 RX ADMIN — Medication 120 MILLIGRAM(S): at 05:34

## 2022-01-25 RX ADMIN — Medication 50 MICROGRAM(S): at 05:35

## 2022-01-25 NOTE — PHYSICAL THERAPY INITIAL EVALUATION ADULT - GAIT DEVIATIONS NOTED, PT EVAL
Called patient to schedule a screening mammogram PATIENTPHONEMESSAGE: left message.-        decreased eliza/decreased step length/decreased weight-shifting ability

## 2022-01-25 NOTE — PATIENT PROFILE ADULT - NSPROGENSOURCEINFO_GEN_A_NUR
Outpatient Speech Language Pathology   Peds Speech Language Progress Note  [facility]     Patient Name: David Trejo  : 2018  MRN: [id number]  Today's Date: 2021      Visit Date: 2021    There is no problem list on file for this patient.      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 Scores from the Shivani Yunstoe Test of Articulation are as follows: raw score of 105, standard score of 55, percentile of 0.1, test age equivalent of >2 years. These scores indicate that articulation abilities are 3 standard deviations below same age peers indicating a severe articulation disorder. He is utilizing the following phonological deviations: fronting, final and initial consonant deletion, gliding, syllable deletion, stopping, cluster reduction, voicing/devoicing, vowelization. He is making steady progress with production of ending sounds and sibilant phonemes with usage of phonetic placement cueing, visual model, verbal prompting and feedback.  -MM    SLP Diagnosis Phonological disorder  -MM    Prognosis Excellent (comment)  -MM    Patient/caregiver participated in establishment of treatment plan and goals Yes  -MM    Patient would benefit from skilled therapy intervention Yes  -MM       SLP Plan    Frequency 1 time per week  -MM    Duration 24  -MM    Planned CPT's? SLP INDIVIDUAL SPEECH THERAPY: 39366  -MM    Plan Comments Focus of treatment on  improvement of intelligibility  -MM          User Key  (r) = Recorded By, (t) = Taken By, (c) = Cosigned By    Initials Name Provider Type    Rosa Belcher MS CCC-SLP Speech and Language Pathologist                                 SLP OP Goals     Row Name 12/07/21 1042          Goal Type Needed    Goal Type Needed Pediatric Goals  -MM            Subjective Comments    Subjective Comments No new concerns reported by parent this date.  -MM            Subjective Pain    Able to rate subjective pain? no  -MM            Short-Term Goals    STG- 1 Patient will improve intelligibility by producing ending sounds at word level with 80% accuracy, mod cues required.   -MM     Status: STG- 1 Progressing as expected  -MM     Comments: STG- 1 100% mod cues  -MM     STG- 2 Patient will improve intelligibility by producing s-blends at word level with 80% accuracy, mod cues required.   -MM     Status: STG- 2 Progressing as expected  -MM     Comments: STG- 2 65% max cues  -MM     STG- 3 Patient will improve intelligibility by producing /s/ at word level with 80% accuracy, mod cues required.   -MM     Status: STG- 3 Progressing as expected  -MM     Comments: STG- 3 initial 100% max cues, final word position /s/ 25% min cues  -MM     STG- 4 Parent will comply with HTP tasks weekly.  -MM     Status: STG- 4 Progressing as expected  -MM     Comments: STG- 4 Compliance reported this date  -MM            Long-Term Goals    LTG- 1 Patient  will  functionally communicate wants and needs for all activities of daily living.  -MM     Status: LTG- 1 Progressing as expected  -MM     Comments: LTG- 1 .  -MM     LTG- 2 Parent will be compliant with HTP weekly.  -MM     Status: LTG- 2 Progressing as expected  -MM     Comments: LTG- 2 .  -MM            SLP Time Calculation    SLP Goal Re-Cert Due Date 12/28/21  -MM           User Key  (r) = Recorded By, (t) = Taken By, (c) = Cosigned By    Initials Name Provider Type    RASTA Jansen  MS RAKEL Lee-SLP Speech and Language Pathologist               OP SLP Education     Row Name 12/07/21 1042       Education    Barriers to Learning No barriers identified  -MM    Education Provided Patient requires further education on strategies, risks; Family/caregivers demonstrated recommended strategies  -MM    Assessed Learning needs; Learning motivation; Learning preferences; Learning readiness  -MM    Learning Motivation Strong  -MM    Learning Method Demonstration; Explanation; Teach back  -MM    Teaching Response Verbalized understanding; Demonstrated understanding  -MM    Education Comments HTP: complete drill and practice of ending sound production, /s/ and /s/-blends.  -MM          User Key  (r) = Recorded By, (t) = Taken By, (c) = Cosigned By    Initials Name Effective Dates    Rosa Belcher MS CCC-SLP 06/16/21 -                    Time Calculation:   SLP Start Time: 1042  SLP Stop Time: 1129  SLP Time Calculation (min): 47 min  Untimed Charges  63936-SZ Treatment/ST Modification Prosth Aug Alter : 47  Total Minutes  Untimed Charges Total Minutes: 47   Total Minutes: 47                   Rosa Jansen MS CCC-SLP  12/7/2021   family

## 2022-01-25 NOTE — PHYSICAL THERAPY INITIAL EVALUATION ADULT - ASR WT BEARING STATUS EVAL
Addended by: JEAN-PAUL SOMERS on: 5/2/2019 02:24 PM     Modules accepted: Level of Service     no weight-bearing restrictions

## 2022-01-25 NOTE — PHYSICAL THERAPY INITIAL EVALUATION ADULT - ADDITIONAL COMMENTS
Pt is extremely [place], difficult to obtain social history. Pt reports living with daughter, Inga, says she walks by herself.  Will need to speak to family re assist needed, [employer] owned

## 2022-01-25 NOTE — PROGRESS NOTE ADULT - ASSESSMENT
89 y/o female with PMH of AFIB (no longer on Xarelto due to risk of fall), hypothyroid came to the ED for shortness of breath. HPI obtained from son (Kobi via phone). As per son, patient has not been in her normal state (which is very agile, A/O x3, able to do ADLs) in the last 4 days. She was hypoxic to 83% on RA which prompted ED visit. Of note, son said his sister who the patient lives with had covid 2 weeks ago.     Acute respiratory failure with hypoxia due to Covid pna  Now off NRB, on NC 6 L but desaturates easily with movement down to low 80s   c/w Decadron   c/w Remdesivir   Tylenol PRN for fever/pain   Isolation protocol   Oxygen therapy as tolerated     Afib   Not on AC due to high risk of fall   Diltiazem 120mg with holding parameters     Hypothyroidism   Synthroid 50mcg     Supportive   DVT prophylaxis: Lovenox   Diet: DASH   Code: FULL    Plan of care discussed with patient's son Kobi ([phone])   PT consult today

## 2022-01-25 NOTE — PHYSICAL THERAPY INITIAL EVALUATION ADULT - PLANNED THERAPY INTERVENTIONS, PT EVAL
balance training/bed mobility training/gait training/neuromuscular re-education/ROM/transfer training

## 2022-01-25 NOTE — PHYSICAL THERAPY INITIAL EVALUATION ADULT - PERTINENT HX OF CURRENT PROBLEM, REHAB EVAL
89 y/o female with PMH of AFIB (no longer on Xarelto due to risk of fall), hypothyroid came to the ED for shortness of breath. Patient is very hard of hearing, HPI obtained from son (Kobi via phone). As per son, patient has not been in her normal state (which is very agile, A/O x3, able to do ADLs) in the last 4 days. She has shortness of breath, fever, cough, Admitted with COVID

## 2022-01-25 NOTE — PHYSICAL THERAPY INITIAL EVALUATION ADULT - IMPAIRMENTS CONTRIBUTING TO GAIT DEVIATIONS, PT EVAL
impaired balance/impaired coordination/decreased flexibility/impaired motor control/decreased ROM/decreased strength

## 2022-01-26 LAB
ALBUMIN SERPL ELPH-MCNC: 3 G/DL — LOW (ref 3.3–5.2)
ALP SERPL-CCNC: 77 U/L — SIGNIFICANT CHANGE UP (ref 40–120)
ALT FLD-CCNC: 25 U/L — SIGNIFICANT CHANGE UP
ANION GAP SERPL CALC-SCNC: 11 MMOL/L — SIGNIFICANT CHANGE UP (ref 5–17)
ANISOCYTOSIS BLD QL: SLIGHT — SIGNIFICANT CHANGE UP
AST SERPL-CCNC: 26 U/L — SIGNIFICANT CHANGE UP
BASOPHILS # BLD AUTO: 0 K/UL — SIGNIFICANT CHANGE UP (ref 0–0.2)
BASOPHILS NFR BLD AUTO: 0 % — SIGNIFICANT CHANGE UP (ref 0–2)
BILIRUB SERPL-MCNC: 0.4 MG/DL — SIGNIFICANT CHANGE UP (ref 0.4–2)
BUN SERPL-MCNC: 48.6 MG/DL — HIGH (ref 8–20)
BURR CELLS BLD QL SMEAR: PRESENT — SIGNIFICANT CHANGE UP
CALCIUM SERPL-MCNC: 8.6 MG/DL — SIGNIFICANT CHANGE UP (ref 8.6–10.2)
CHLORIDE SERPL-SCNC: 105 MMOL/L — SIGNIFICANT CHANGE UP (ref 98–107)
CO2 SERPL-SCNC: 28 MMOL/L — SIGNIFICANT CHANGE UP (ref 22–29)
CREAT SERPL-MCNC: 0.72 MG/DL — SIGNIFICANT CHANGE UP (ref 0.5–1.3)
D DIMER BLD IA.RAPID-MCNC: 248 NG/ML DDU — HIGH
DACRYOCYTES BLD QL SMEAR: SLIGHT — SIGNIFICANT CHANGE UP
EOSINOPHIL # BLD AUTO: 0 K/UL — SIGNIFICANT CHANGE UP (ref 0–0.5)
EOSINOPHIL NFR BLD AUTO: 0 % — SIGNIFICANT CHANGE UP (ref 0–6)
GIANT PLATELETS BLD QL SMEAR: PRESENT — SIGNIFICANT CHANGE UP
GLUCOSE SERPL-MCNC: 154 MG/DL — HIGH (ref 70–99)
HCT VFR BLD CALC: 44.5 % — SIGNIFICANT CHANGE UP (ref 34.5–45)
HGB BLD-MCNC: 14.5 G/DL — SIGNIFICANT CHANGE UP (ref 11.5–15.5)
LYMPHOCYTES # BLD AUTO: 0.46 K/UL — LOW (ref 1–3.3)
LYMPHOCYTES # BLD AUTO: 8 % — LOW (ref 13–44)
MANUAL SMEAR VERIFICATION: SIGNIFICANT CHANGE UP
MCHC RBC-ENTMCNC: 30.5 PG — SIGNIFICANT CHANGE UP (ref 27–34)
MCHC RBC-ENTMCNC: 32.6 GM/DL — SIGNIFICANT CHANGE UP (ref 32–36)
MCV RBC AUTO: 93.7 FL — SIGNIFICANT CHANGE UP (ref 80–100)
MONOCYTES # BLD AUTO: 0.82 K/UL — SIGNIFICANT CHANGE UP (ref 0–0.9)
MONOCYTES NFR BLD AUTO: 14.3 % — HIGH (ref 2–14)
MYELOCYTES NFR BLD: 0.9 % — HIGH (ref 0–0)
NEUTROPHILS # BLD AUTO: 4.33 K/UL — SIGNIFICANT CHANGE UP (ref 1.8–7.4)
NEUTROPHILS NFR BLD AUTO: 75.9 % — SIGNIFICANT CHANGE UP (ref 43–77)
NRBC # BLD: 1 /100 — HIGH (ref 0–0)
OVALOCYTES BLD QL SMEAR: SLIGHT — SIGNIFICANT CHANGE UP
PLAT MORPH BLD: NORMAL — SIGNIFICANT CHANGE UP
PLATELET # BLD AUTO: 448 K/UL — HIGH (ref 150–400)
POIKILOCYTOSIS BLD QL AUTO: SLIGHT — SIGNIFICANT CHANGE UP
POLYCHROMASIA BLD QL SMEAR: SLIGHT — SIGNIFICANT CHANGE UP
POTASSIUM SERPL-MCNC: 3.6 MMOL/L — SIGNIFICANT CHANGE UP (ref 3.5–5.3)
POTASSIUM SERPL-SCNC: 3.6 MMOL/L — SIGNIFICANT CHANGE UP (ref 3.5–5.3)
PROT SERPL-MCNC: 6.2 G/DL — LOW (ref 6.6–8.7)
RBC # BLD: 4.75 M/UL — SIGNIFICANT CHANGE UP (ref 3.8–5.2)
RBC # FLD: 13.3 % — SIGNIFICANT CHANGE UP (ref 10.3–14.5)
RBC BLD AUTO: SIGNIFICANT CHANGE UP
SCHISTOCYTES BLD QL AUTO: SLIGHT — SIGNIFICANT CHANGE UP
SODIUM SERPL-SCNC: 144 MMOL/L — SIGNIFICANT CHANGE UP (ref 135–145)
VARIANT LYMPHS # BLD: 0.9 % — SIGNIFICANT CHANGE UP (ref 0–6)
WBC # BLD: 5.7 K/UL — SIGNIFICANT CHANGE UP (ref 3.8–10.5)
WBC # FLD AUTO: 5.7 K/UL — SIGNIFICANT CHANGE UP (ref 3.8–10.5)

## 2022-01-26 PROCEDURE — 99233 SBSQ HOSP IP/OBS HIGH 50: CPT

## 2022-01-26 PROCEDURE — 71045 X-RAY EXAM CHEST 1 VIEW: CPT | Mod: 26

## 2022-01-26 RX ORDER — SODIUM CHLORIDE 9 MG/ML
1000 INJECTION, SOLUTION INTRAVENOUS
Refills: 0 | Status: DISCONTINUED | OUTPATIENT
Start: 2022-01-26 | End: 2022-01-27

## 2022-01-26 RX ADMIN — ENOXAPARIN SODIUM 40 MILLIGRAM(S): 100 INJECTION SUBCUTANEOUS at 13:59

## 2022-01-26 RX ADMIN — Medication 50 MICROGRAM(S): at 06:32

## 2022-01-26 RX ADMIN — Medication 6 MILLIGRAM(S): at 06:33

## 2022-01-26 RX ADMIN — SODIUM CHLORIDE 60 MILLILITER(S): 9 INJECTION, SOLUTION INTRAVENOUS at 19:48

## 2022-01-26 NOTE — SWALLOW BEDSIDE ASSESSMENT ADULT - SLP GENERAL OBSERVATIONS
Pt recd awake/upright in bed, A&A Ox1, reduced cognition/confusion, +HFNC 100% 40L SpO2 % throughout, 0/10 pain pre/post, significantly [place], anxiety noted

## 2022-01-26 NOTE — CHART NOTE - NSCHARTNOTEFT_GEN_A_CORE
Called by RN after pt desat on 6L O2 via nc, oxygen titrated upto NRB, sating 97% currently, feeling better, not tachypenic or w/ any other complaints.  Reviewed chart, CTA chest 1/24 negative for PE; Last CXR on admission w/ b/l infiltrates.  D dimer 278 on 1/23  Repeat D dimer/ CXR ordered for this am  Pt w/ stable creatinine, recommending repeat CTA chest for worsening status

## 2022-01-26 NOTE — SWALLOW BEDSIDE ASSESSMENT ADULT - SLP PERTINENT HISTORY OF CURRENT PROBLEM
As per MD note, "89 y/o female with PMH of AFIB (no longer on Xarelto due to risk of fall), hypothyroid came to the ED for shortness of breath. HPI obtained from son (Kobi via phone). As per son, patient has not been in her normal state (which is very agile, A/O x3, able to do ADLs) in the last 4 days. She was hypoxic to 83% on RA which prompted ED visit. Of note, son said his sister who the patient lives with had covid 2 weeks ago. Admit with acute hypoxic respiratory failure due to COVID PNA".

## 2022-01-26 NOTE — SWALLOW BEDSIDE ASSESSMENT ADULT - SWALLOW EVAL: DIAGNOSIS
Oral stage of swallow generally functional for limited trials of puree provided. Pharyngeal dysphagia suspected at this time, +consistent increased WOB noted post intake. No further PO provided, Pt deemed unsafe for diet initiation, high risk for aspiration.

## 2022-01-26 NOTE — PROGRESS NOTE ADULT - ASSESSMENT
89 y/o female with PMH of AFIB (no longer on Xarelto due to risk of fall), hypothyroid came to the ED for shortness of breath. HPI obtained from son (Kobi via phone). As per son, patient has not been in her normal state (which is very agile, A/O x3, able to do ADLs) in the last 4 days. She was hypoxic to 83% on RA which prompted ED visit. Of note, son said his sister who the patient lives with had covid 2 weeks ago.     Acute respiratory failure with hypoxia due to Covid pna  Worsening, now on NRB  c/w Decadron   c/w Remdesivir   Discussed Actemra with son and ID  ID consulted   Tylenol PRN for fever/pain   Isolation protocol   Oxygen therapy as tolerated     Afib   Not on AC due to high risk of fall   Diltiazem 120mg with holding parameters     Hypothyroidism   Synthroid 50mcg     Supportive   DVT prophylaxis: Lovenox   Diet: DASH   Code: FULL    Plan of care discussed with patient's son Kobi ([phone])

## 2022-01-27 LAB
ALBUMIN SERPL ELPH-MCNC: 2.8 G/DL — LOW (ref 3.3–5.2)
ALP SERPL-CCNC: 67 U/L — SIGNIFICANT CHANGE UP (ref 40–120)
ALT FLD-CCNC: 22 U/L — SIGNIFICANT CHANGE UP
ANION GAP SERPL CALC-SCNC: 13 MMOL/L — SIGNIFICANT CHANGE UP (ref 5–17)
APPEARANCE UR: CLEAR — SIGNIFICANT CHANGE UP
AST SERPL-CCNC: 26 U/L — SIGNIFICANT CHANGE UP
BACTERIA # UR AUTO: ABNORMAL
BILIRUB SERPL-MCNC: 0.3 MG/DL — LOW (ref 0.4–2)
BILIRUB UR-MCNC: NEGATIVE — SIGNIFICANT CHANGE UP
BUN SERPL-MCNC: 39.2 MG/DL — HIGH (ref 8–20)
CALCIUM SERPL-MCNC: 8.4 MG/DL — LOW (ref 8.6–10.2)
CHLORIDE SERPL-SCNC: 107 MMOL/L — SIGNIFICANT CHANGE UP (ref 98–107)
CO2 SERPL-SCNC: 26 MMOL/L — SIGNIFICANT CHANGE UP (ref 22–29)
COLOR SPEC: YELLOW — SIGNIFICANT CHANGE UP
CREAT SERPL-MCNC: 0.6 MG/DL — SIGNIFICANT CHANGE UP (ref 0.5–1.3)
CRP SERPL-MCNC: 24 MG/L — HIGH
DIFF PNL FLD: NEGATIVE — SIGNIFICANT CHANGE UP
EPI CELLS # UR: SIGNIFICANT CHANGE UP
FERRITIN SERPL-MCNC: 230 NG/ML — HIGH (ref 15–150)
GLUCOSE BLDC GLUCOMTR-MCNC: 167 MG/DL — HIGH (ref 70–99)
GLUCOSE BLDC GLUCOMTR-MCNC: 177 MG/DL — HIGH (ref 70–99)
GLUCOSE SERPL-MCNC: 192 MG/DL — HIGH (ref 70–99)
GLUCOSE UR QL: NEGATIVE MG/DL — SIGNIFICANT CHANGE UP
HCT VFR BLD CALC: 41.7 % — SIGNIFICANT CHANGE UP (ref 34.5–45)
HGB BLD-MCNC: 13.2 G/DL — SIGNIFICANT CHANGE UP (ref 11.5–15.5)
HYALINE CASTS # UR AUTO: ABNORMAL /LPF
KETONES UR-MCNC: NEGATIVE — SIGNIFICANT CHANGE UP
LEUKOCYTE ESTERASE UR-ACNC: ABNORMAL
MCHC RBC-ENTMCNC: 30.1 PG — SIGNIFICANT CHANGE UP (ref 27–34)
MCHC RBC-ENTMCNC: 31.7 GM/DL — LOW (ref 32–36)
MCV RBC AUTO: 95.2 FL — SIGNIFICANT CHANGE UP (ref 80–100)
NITRITE UR-MCNC: NEGATIVE — SIGNIFICANT CHANGE UP
PH UR: 6 — SIGNIFICANT CHANGE UP (ref 5–8)
PLATELET # BLD AUTO: 397 K/UL — SIGNIFICANT CHANGE UP (ref 150–400)
POTASSIUM SERPL-MCNC: 4.5 MMOL/L — SIGNIFICANT CHANGE UP (ref 3.5–5.3)
POTASSIUM SERPL-SCNC: 4.5 MMOL/L — SIGNIFICANT CHANGE UP (ref 3.5–5.3)
PROCALCITONIN SERPL-MCNC: 0.05 NG/ML — SIGNIFICANT CHANGE UP (ref 0.02–0.1)
PROT SERPL-MCNC: 5.9 G/DL — LOW (ref 6.6–8.7)
PROT UR-MCNC: 30 MG/DL
RBC # BLD: 4.38 M/UL — SIGNIFICANT CHANGE UP (ref 3.8–5.2)
RBC # FLD: 13.2 % — SIGNIFICANT CHANGE UP (ref 10.3–14.5)
SODIUM SERPL-SCNC: 146 MMOL/L — HIGH (ref 135–145)
SP GR SPEC: 1.02 — SIGNIFICANT CHANGE UP (ref 1.01–1.02)
UROBILINOGEN FLD QL: 1 MG/DL
WBC # BLD: 6.26 K/UL — SIGNIFICANT CHANGE UP (ref 3.8–10.5)
WBC # FLD AUTO: 6.26 K/UL — SIGNIFICANT CHANGE UP (ref 3.8–10.5)
WBC UR QL: SIGNIFICANT CHANGE UP /HPF (ref 0–5)

## 2022-01-27 PROCEDURE — 99223 1ST HOSP IP/OBS HIGH 75: CPT

## 2022-01-27 PROCEDURE — 99233 SBSQ HOSP IP/OBS HIGH 50: CPT

## 2022-01-27 RX ORDER — DEXTROSE 50 % IN WATER 50 %
15 SYRINGE (ML) INTRAVENOUS ONCE
Refills: 0 | Status: DISCONTINUED | OUTPATIENT
Start: 2022-01-27 | End: 2022-02-05

## 2022-01-27 RX ORDER — INSULIN LISPRO 100/ML
VIAL (ML) SUBCUTANEOUS
Refills: 0 | Status: DISCONTINUED | OUTPATIENT
Start: 2022-01-27 | End: 2022-02-05

## 2022-01-27 RX ORDER — PANTOPRAZOLE SODIUM 20 MG/1
40 TABLET, DELAYED RELEASE ORAL DAILY
Refills: 0 | Status: DISCONTINUED | OUTPATIENT
Start: 2022-01-27 | End: 2022-02-03

## 2022-01-27 RX ORDER — DEXTROSE 50 % IN WATER 50 %
25 SYRINGE (ML) INTRAVENOUS ONCE
Refills: 0 | Status: DISCONTINUED | OUTPATIENT
Start: 2022-01-27 | End: 2022-02-05

## 2022-01-27 RX ORDER — SODIUM CHLORIDE 9 MG/ML
1000 INJECTION, SOLUTION INTRAVENOUS
Refills: 0 | Status: DISCONTINUED | OUTPATIENT
Start: 2022-01-27 | End: 2022-02-05

## 2022-01-27 RX ORDER — ALPRAZOLAM 0.25 MG
0.25 TABLET ORAL ONCE
Refills: 0 | Status: DISCONTINUED | OUTPATIENT
Start: 2022-01-27 | End: 2022-01-27

## 2022-01-27 RX ORDER — SODIUM CHLORIDE 9 MG/ML
1000 INJECTION, SOLUTION INTRAVENOUS
Refills: 0 | Status: DISCONTINUED | OUTPATIENT
Start: 2022-01-27 | End: 2022-02-04

## 2022-01-27 RX ORDER — INSULIN LISPRO 100/ML
VIAL (ML) SUBCUTANEOUS AT BEDTIME
Refills: 0 | Status: DISCONTINUED | OUTPATIENT
Start: 2022-01-27 | End: 2022-02-05

## 2022-01-27 RX ORDER — ACETAMINOPHEN 500 MG
650 TABLET ORAL ONCE
Refills: 0 | Status: COMPLETED | OUTPATIENT
Start: 2022-01-27 | End: 2022-01-27

## 2022-01-27 RX ORDER — GLUCAGON INJECTION, SOLUTION 0.5 MG/.1ML
1 INJECTION, SOLUTION SUBCUTANEOUS ONCE
Refills: 0 | Status: DISCONTINUED | OUTPATIENT
Start: 2022-01-27 | End: 2022-02-05

## 2022-01-27 RX ORDER — DEXTROSE 50 % IN WATER 50 %
12.5 SYRINGE (ML) INTRAVENOUS ONCE
Refills: 0 | Status: DISCONTINUED | OUTPATIENT
Start: 2022-01-27 | End: 2022-02-05

## 2022-01-27 RX ADMIN — Medication 120 MILLIGRAM(S): at 05:17

## 2022-01-27 RX ADMIN — PANTOPRAZOLE SODIUM 40 MILLIGRAM(S): 20 TABLET, DELAYED RELEASE ORAL at 11:16

## 2022-01-27 RX ADMIN — SODIUM CHLORIDE 100 MILLILITER(S): 9 INJECTION, SOLUTION INTRAVENOUS at 23:11

## 2022-01-27 RX ADMIN — Medication 1: at 17:19

## 2022-01-27 RX ADMIN — Medication 6 MILLIGRAM(S): at 05:17

## 2022-01-27 RX ADMIN — SODIUM CHLORIDE 60 MILLILITER(S): 9 INJECTION, SOLUTION INTRAVENOUS at 11:16

## 2022-01-27 RX ADMIN — REMDESIVIR 500 MILLIGRAM(S): 5 INJECTION INTRAVENOUS at 23:11

## 2022-01-27 RX ADMIN — Medication 0.25 MILLIGRAM(S): at 00:43

## 2022-01-27 RX ADMIN — ENOXAPARIN SODIUM 40 MILLIGRAM(S): 100 INJECTION SUBCUTANEOUS at 11:17

## 2022-01-27 RX ADMIN — Medication 650 MILLIGRAM(S): at 05:17

## 2022-01-27 RX ADMIN — TAMSULOSIN HYDROCHLORIDE 0.4 MILLIGRAM(S): 0.4 CAPSULE ORAL at 22:00

## 2022-01-27 RX ADMIN — REMDESIVIR 500 MILLIGRAM(S): 5 INJECTION INTRAVENOUS at 00:43

## 2022-01-27 RX ADMIN — SODIUM CHLORIDE 100 MILLILITER(S): 9 INJECTION, SOLUTION INTRAVENOUS at 14:12

## 2022-01-27 RX ADMIN — Medication 50 MICROGRAM(S): at 05:17

## 2022-01-27 NOTE — PROGRESS NOTE ADULT - ASSESSMENT
89 years old female with history of Chronic A. Fib (no longer on Xarelto due to fall risk) and Hypothyroidism presented with shortness of breath. + COVID exposure.     1) 1) Acute Respiratory Failure with Hypoxia  - secondary to COVID-19 Pneumonia  - Dexamethasone 6 mg IVP daily   - Tylenol, Albuterol and Robitussin PRN  - Continue supplemental oxygen via HFNC, wean off as tolerated, currently on 60% FiO2  - Encourage for prone position every 2-3 hours for 30 minutes  - Incentive spirometry  - ID Consult     2) Chronic A. Fib  - Continue Cardizem  - Not on AC due to fall risk    3) Prediabetes   - HbA1c 6.3  - Accu checks and ISS    4) Hypothyroidism  - Continue Synthroid    5) Dysphagia  - ST following, recommended -- Pureed only. No liquids.     6) Hypernatremia  - Likely due to dehydration  - IVF    DVT Prophylaxis -- Lovenox SQ    GOC: Full Code. Prognosis guarded.     Dispo: [facility] once stable.      89 years old female with history of Chronic A. Fib (no longer on Xarelto due to fall risk) and Hypothyroidism presented with shortness of breath. + COVID exposure.     1) 1) Acute Respiratory Failure with Hypoxia  - secondary to COVID-19 Pneumonia  - Continue Remdesivir   - Dexamethasone 6 mg IVP daily   - Tylenol, Albuterol and Robitussin PRN  - Continue supplemental oxygen via HFNC, wean off as tolerated, currently on 60% FiO2  - Encourage for prone position every 2-3 hours for 30 minutes  - Incentive spirometry  - ID Consult     2) Chronic A. Fib  - Continue Cardizem  - Not on AC due to fall risk    3) Prediabetes   - HbA1c 6.3  - Accu checks and ISS    4) Hypothyroidism  - Continue Synthroid    5) Dysphagia  - ST following, recommended -- Pureed only. No liquids.     6) Hypernatremia  - Likely due to dehydration  - IVF    DVT Prophylaxis -- Lovenox SQ    GOC: Full Code. Prognosis guarded.     Dispo: [facility] once stable.      89 years old female with history of Chronic A. Fib (no longer on Xarelto due to fall risk) and Hypothyroidism presented with shortness of breath. + COVID exposure.     1) Acute Respiratory Failure with Hypoxia  - secondary to COVID-19 Pneumonia  - Continue Remdesivir   - Dexamethasone 6 mg IVP daily   - Tylenol, Albuterol and Robitussin PRN  - Continue supplemental oxygen via HFNC, wean off as tolerated, currently on 60% FiO2  - Encourage for prone position every 2-3 hours for 30 minutes  - Incentive spirometry  - ID Consult     2) Chronic A. Fib  - Continue Cardizem  - Not on AC due to fall risk    3) Prediabetes   - HbA1c 6.3  - Accu checks and ISS    4) Hypothyroidism  - Continue Synthroid    5) Dysphagia  - ST following, recommended -- Pureed only. No liquids.     6) Hypernatremia  - Likely due to dehydration  - IVF    DVT Prophylaxis -- Lovenox SQ    GOC: Full Code. Prognosis guarded.     Dispo: [facility] once stable.

## 2022-01-27 NOTE — CONSULT NOTE ADULT - SUBJECTIVE AND OBJECTIVE BOX
[facility]  INFECTIOUS DISEASES AND INTERNAL MEDICINE at [place] and [place]  =======================================================                               Radames Gutierrez MD#  Duc Mancini MD*                                     Irlanda Lee MD*    Teetee Miguel MD*            Diplomates American Board of Internal Medicine & Infectious Diseases                # [place] Office - Appt - Tel  [phone] Fax [phone]                * [place] Office - Appt - Tel [phone] Fax [phone]                                  Hospital Consult line:  [phone]  =======================================================      [id number]  JUVENTINO DON    History obtained from the chart. Patient unable to provide history.     CC: Patient is a 89y old  Female who presents with a chief complaint of COVID pna (26 Jan 2022 11:15)      89y  Female with h/o Afib (no longer on Xarelto due to risk of fall), hypothyroidism. Patient came to the ED for shortness of breath. Patient has shortness of breath, fever, cough, headache while at home and was noted to be hypoxic to 83% on RA on day of presentation which prompted ED visit. Patient's daughter whom she lives with had COVID 2 weeks prior. Patient was not checked for COVID, and she is not vaccinated against COVID. Patient was admitted on 1/23 with Acute hypoxic respiratory failure. Started on Remdesivir. Now with worsening hypoxia. ID input requested.       Past Medical & Surgical Hx:  Arthritis  Joint contracture Left shoulder  Afib  History of knee replacement Right  History of hip replacement Left  S/P wrist surgery Right      Social Hx:  Unable to obtain due to medical condition       FAMILY HISTORY:  Family history of myocardial infarction (Father, Mother)      Allergies  No Known Allergies       REVIEW OF SYSTEMS:  Unable to obtain due to medical condition       Physical Exam:  GEN: NAD, on HFNC   HEENT: normocephalic and atraumatic. EOMI. PERRL.  Anicteric  NECK: Supple.   LUNGS: Coarse BS B/L  HEART: Regular rate and rhythm   ABDOMEN: Soft, nontender, and nondistended.  Positive bowel sounds.    : No CVA tenderness  EXTREMITIES: Without any edema.  MSK: No joint swelling  NEUROLOGIC:  No Focal Deficits  PSYCHIATRIC: Confused   SKIN: No Rash      Vitals:  T(F): 98 (27 Jan 2022 10:56), Max: 98.2 (26 Jan 2022 16:45)  HR: 68 (27 Jan 2022 15:12)  BP: 124/82 (27 Jan 2022 10:56)  RR: 18 (27 Jan 2022 15:12)  SpO2: 100% (27 Jan 2022 15:12) (96% - 100%)  temp max in last 48H T(F): , Max: 98.2 (01-26-22 @ 16:45)      Current Antibiotics:  remdesivir  IVPB   IV Intermittent   remdesivir  IVPB 100 milliGRAM(s) IV Intermittent every 24 hours      Other medications:  dexAMETHasone  Injectable 6 milliGRAM(s) IV Push daily  dextrose 40% Gel 15 Gram(s) Oral once  dextrose 5%. 1000 milliLiter(s) IV Continuous <Continuous>  dextrose 5%. 1000 milliLiter(s) IV Continuous <Continuous>  dextrose 50% Injectable 25 Gram(s) IV Push once  dextrose 50% Injectable 12.5 Gram(s) IV Push once  dextrose 50% Injectable 25 Gram(s) IV Push once  diltiazem    milliGRAM(s) Oral daily  enoxaparin Injectable 40 milliGRAM(s) SubCutaneous daily  glucagon  Injectable 1 milliGRAM(s) IntraMuscular once  insulin lispro (ADMELOG) corrective regimen sliding scale   SubCutaneous three times a day before meals  insulin lispro (ADMELOG) corrective regimen sliding scale   SubCutaneous at bedtime  lactated ringers. 1000 milliLiter(s) IV Continuous <Continuous>  levothyroxine 50 MICROGram(s) Oral daily  pantoprazole  Injectable 40 milliGRAM(s) IV Push daily  tamsulosin 0.4 milliGRAM(s) Oral at bedtime                 13.2   6.26  )-----------( 397      ( 27 Jan 2022 10:27 )             41.7     01-27    146<H>  |  107  |  39.2<H>  ----------------------------<  192<H>  4.5   |  26.0  |  0.60    Ca    8.4<L>      27 Jan 2022 08:30    TPro  5.9<L>  /  Alb  2.8<L>  /  TBili  0.3<L>  /  DBili  x   /  AST  26  /  ALT  22  /  AlkPhos  67  01-27    RECENT CULTURES:  01-23 @ 21:39 .Blood Blood     No growth at 48 hours      WBC Count: 6.26 K/uL (01-27-22 @ 10:27)  WBC Count: 5.70 K/uL (01-26-22 @ 07:50)  WBC Count: 4.54 K/uL (01-25-22 @ 09:10)  WBC Count: 2.41 K/uL (01-24-22 @ 10:23)  WBC Count: 5.25 K/uL (01-23-22 @ 18:42)    Creatinine, Serum: 0.60 mg/dL (01-27-22 @ 08:30)  Creatinine, Serum: 0.72 mg/dL (01-26-22 @ 07:50)  Creatinine, Serum: 0.76 mg/dL (01-25-22 @ 09:10)  Creatinine, Serum: 0.58 mg/dL (01-24-22 @ 10:23)  Creatinine, Serum: 0.58 mg/dL (01-23-22 @ 18:42)    C-Reactive Protein, Serum: 24 mg/L (01-27-22 @ 08:30)  C-Reactive Protein, Serum: 70 mg/L (01-25-22 @ 09:10)    Ferritin, Serum: 230 ng/mL (01-27-22 @ 08:30)  Ferritin, Serum: 280 ng/mL (01-25-22 @ 09:10)    Procalcitonin, Serum: 0.05 ng/mL (01-27-22 @ 13:37)     SARS-CoV-2: Detected (01-23-22 @ 18:44)  Rapid RVP Result: Detected (01-23-22 @ 18:44)    SARS-CoV-2: Detected (01-23-22 @ 18:44)      < from: CT Angio Chest PE Protocol w/ IV Cont (01.24.22 @ 00:50) >  ACC: [id number] EXAM:  CT ANGIO CHEST PULM AARON CARRANZA                          PROCEDURE DATE:  01/24/2022          INTERPRETATION:  INDICATION: Covid positive, positive d-dimer    TECHNIQUE: Volumetric images of the chest were obtained after the   administration of 74 mL of Omnipaque 350. Maximum intensity projection   images were generated.    COMPARISON: None.    FINDINGS:    PULMONARY ANGIOGRAM:  No pulmonary embolism. Normal caliber main   pulmonary artery.    LUNGS/AIRWAYS/PLEURA: Groundglass with peripheral and central component   involving bilateral upper and lower lobes. Small pleural effusions and   associated passive atelectasis of the lower lobes. Regions of low   attenuation within the atelectasis, suggesting a superimposed process,   likely pneumonia. Patent airways to the segmental bronchi.    LYMPH NODES: Unremarkable.    HEART/VASCULATURE: Mildly cardiomegaly. No pericardial effusion. Normal   caliber aorta. Mildly calcified aortic valve.    UPPER ABDOMEN: Right kidney cyst.    BONES/SOFT TISSUES: Mild loss of height of the T7 vertebral body.   Degenerative changes of the spine and glenohumeral joints.      IMPRESSION:    No pulmonary embolism.    Multilobar pneumonia with features compatible with COVID.    Small pleural effusions, and passive atelectasis superimposed on   pneumonia in the lower lobes.    < end of copied text >

## 2022-01-28 LAB
ALBUMIN SERPL ELPH-MCNC: 2.7 G/DL — LOW (ref 3.3–5.2)
ALP SERPL-CCNC: 64 U/L — SIGNIFICANT CHANGE UP (ref 40–120)
ALT FLD-CCNC: 19 U/L — SIGNIFICANT CHANGE UP
ANION GAP SERPL CALC-SCNC: 8 MMOL/L — SIGNIFICANT CHANGE UP (ref 5–17)
AST SERPL-CCNC: 15 U/L — SIGNIFICANT CHANGE UP
BILIRUB SERPL-MCNC: 0.3 MG/DL — LOW (ref 0.4–2)
BUN SERPL-MCNC: 26.4 MG/DL — HIGH (ref 8–20)
CALCIUM SERPL-MCNC: 8.1 MG/DL — LOW (ref 8.6–10.2)
CHLORIDE SERPL-SCNC: 107 MMOL/L — SIGNIFICANT CHANGE UP (ref 98–107)
CO2 SERPL-SCNC: 28 MMOL/L — SIGNIFICANT CHANGE UP (ref 22–29)
CREAT SERPL-MCNC: 0.45 MG/DL — LOW (ref 0.5–1.3)
CULTURE RESULTS: SIGNIFICANT CHANGE UP
CULTURE RESULTS: SIGNIFICANT CHANGE UP
GLUCOSE BLDC GLUCOMTR-MCNC: 113 MG/DL — HIGH (ref 70–99)
GLUCOSE BLDC GLUCOMTR-MCNC: 138 MG/DL — HIGH (ref 70–99)
GLUCOSE BLDC GLUCOMTR-MCNC: 142 MG/DL — HIGH (ref 70–99)
GLUCOSE BLDC GLUCOMTR-MCNC: 156 MG/DL — HIGH (ref 70–99)
GLUCOSE BLDC GLUCOMTR-MCNC: 172 MG/DL — HIGH (ref 70–99)
GLUCOSE SERPL-MCNC: 155 MG/DL — HIGH (ref 70–99)
POTASSIUM SERPL-MCNC: 3.8 MMOL/L — SIGNIFICANT CHANGE UP (ref 3.5–5.3)
POTASSIUM SERPL-SCNC: 3.8 MMOL/L — SIGNIFICANT CHANGE UP (ref 3.5–5.3)
PROT SERPL-MCNC: 5.3 G/DL — LOW (ref 6.6–8.7)
SODIUM SERPL-SCNC: 143 MMOL/L — SIGNIFICANT CHANGE UP (ref 135–145)
SPECIMEN SOURCE: SIGNIFICANT CHANGE UP
SPECIMEN SOURCE: SIGNIFICANT CHANGE UP

## 2022-01-28 PROCEDURE — 99232 SBSQ HOSP IP/OBS MODERATE 35: CPT

## 2022-01-28 PROCEDURE — 99233 SBSQ HOSP IP/OBS HIGH 50: CPT

## 2022-01-28 RX ORDER — REMDESIVIR 5 MG/ML
100 INJECTION INTRAVENOUS EVERY 24 HOURS
Refills: 0 | Status: ACTIVE | OUTPATIENT
Start: 2022-01-28 | End: 2022-02-02

## 2022-01-28 RX ORDER — DIPHENHYDRAMINE HYDROCHLORIDE AND LIDOCAINE HYDROCHLORIDE AND ALUMINUM HYDROXIDE AND MAGNESIUM HYDRO
5 KIT
Refills: 0 | Status: DISCONTINUED | OUTPATIENT
Start: 2022-01-28 | End: 2022-02-05

## 2022-01-28 RX ADMIN — REMDESIVIR 500 MILLIGRAM(S): 5 INJECTION INTRAVENOUS at 15:52

## 2022-01-28 RX ADMIN — Medication 6 MILLIGRAM(S): at 05:20

## 2022-01-28 RX ADMIN — DIPHENHYDRAMINE HYDROCHLORIDE AND LIDOCAINE HYDROCHLORIDE AND ALUMINUM HYDROXIDE AND MAGNESIUM HYDRO 5 MILLILITER(S): KIT at 18:04

## 2022-01-28 RX ADMIN — Medication 50 MICROGRAM(S): at 05:19

## 2022-01-28 RX ADMIN — ENOXAPARIN SODIUM 40 MILLIGRAM(S): 100 INJECTION SUBCUTANEOUS at 11:29

## 2022-01-28 RX ADMIN — TAMSULOSIN HYDROCHLORIDE 0.4 MILLIGRAM(S): 0.4 CAPSULE ORAL at 21:33

## 2022-01-28 RX ADMIN — SODIUM CHLORIDE 100 MILLILITER(S): 9 INJECTION, SOLUTION INTRAVENOUS at 11:28

## 2022-01-28 RX ADMIN — PANTOPRAZOLE SODIUM 40 MILLIGRAM(S): 20 TABLET, DELAYED RELEASE ORAL at 11:28

## 2022-01-28 RX ADMIN — Medication 1: at 14:48

## 2022-01-28 RX ADMIN — Medication 120 MILLIGRAM(S): at 05:19

## 2022-01-28 NOTE — DIETITIAN INITIAL EVALUATION ADULT. - OTHER INFO
89 y/o female presenting with acute hypoxic respiratory failure, prediabetes (A1c 6.3), dysphagia (puree diet with no liquids). PMHx of Chronic A. Fib (no longer on Xarelto due to fall risk) and Hypothyroidism presented with shortness of breath. Skin remains intact. Last documented BM 1/25.

## 2022-01-28 NOTE — DIETITIAN INITIAL EVALUATION ADULT. - PERTINENT LABORATORY DATA
01-28 @ 08:09: Na 143, BUN 26.4<H>, Cr 0.45<L>, <H>, K+ 3.8, Phos --, Mg --, Alk Phos 64, ALT/SGPT 19, AST/SGOT 15, HbA1c --

## 2022-01-28 NOTE — PROGRESS NOTE ADULT - ASSESSMENT
89y  Female with h/o Afib (no longer on Xarelto due to risk of fall), hypothyroidism. Patient came to the ED for shortness of breath. Patient has shortness of breath, fever, cough, headache while at home and was noted to be hypoxic to 83% on RA on day of presentation which prompted ED visit. Patient's daughter whom she lives with had COVID 2 weeks prior. Patient was not checked for COVID, and she is not vaccinated against COVID. Patient was admitted on 1/23 with Acute hypoxic respiratory failure. Started on Remdesivir. Now with worsening hypoxia.      Acute Hypoxic respiratory failure  COVID-19 infection  B/L Pneumonia   cough  shortness of breath      - COVID 19 PCR positive   - Blood cultures no growth   - CT Chest with no PE. + PNA   - Continue supportive care measures  - continue to trend inflammatory markers.   - trend CBC with diff, CMP,  CRP, Ferritin, D Dimer  procalcitonin q 48hours  - Avoid antibiotics unless there is a concern for a bacterial infection  - May consider vitamin C, thiamine and zinc (note lack of evidence to support benefit with COVID 19)  - Continue Remdesivir 200mg IV x1 followed by 100mg IV daily, will extend course   - Dexamethasone Daily while on Remdesivir   - No longer a candidate for Actemra due to duration of symptoms   - Not a candidate for Casirivimab/Imdevimab or Sotrovimab   - Follow up cultures  - Trend Fever  - Trend WBC      Will Follow    d/w Dr Owusu

## 2022-01-28 NOTE — DIETITIAN INITIAL EVALUATION ADULT. - PERTINENT MEDS FT
MEDICATIONS  (STANDING):  dexAMETHasone  Injectable 6 milliGRAM(s) IV Push daily  dextrose 40% Gel 15 Gram(s) Oral once  dextrose 5%. 1000 milliLiter(s) (50 mL/Hr) IV Continuous <Continuous>  dextrose 5%. 1000 milliLiter(s) (100 mL/Hr) IV Continuous <Continuous>  dextrose 50% Injectable 25 Gram(s) IV Push once  dextrose 50% Injectable 12.5 Gram(s) IV Push once  dextrose 50% Injectable 25 Gram(s) IV Push once  diltiazem    milliGRAM(s) Oral daily  enoxaparin Injectable 40 milliGRAM(s) SubCutaneous daily  glucagon  Injectable 1 milliGRAM(s) IntraMuscular once  insulin lispro (ADMELOG) corrective regimen sliding scale   SubCutaneous three times a day before meals  insulin lispro (ADMELOG) corrective regimen sliding scale   SubCutaneous at bedtime  lactated ringers. 1000 milliLiter(s) (100 mL/Hr) IV Continuous <Continuous>  levothyroxine 50 MICROGram(s) Oral daily  pantoprazole  Injectable 40 milliGRAM(s) IV Push daily  tamsulosin 0.4 milliGRAM(s) Oral at bedtime    MEDICATIONS  (PRN):  acetaminophen     Tablet .. 650 milliGRAM(s) Oral every 4 hours PRN Temp greater or equal to 38.5C (101.3F)  ALBUTerol    90 MICROgram(s) HFA Inhaler 2 Puff(s) Inhalation every 4 hours PRN Shortness of Breath and/or Wheezing  benzonatate 100 milliGRAM(s) Oral three times a day PRN Cough  guaifenesin/dextromethorphan Oral Liquid 10 milliLiter(s) Oral every 4 hours PRN Cough

## 2022-01-28 NOTE — DIETITIAN INITIAL EVALUATION ADULT. - SIGNS/SYMPTOMS
as evidenced by meeting <50% of estimated energy needs for >5days, NFPE findings, 2+ L hand edema as evidenced by meeting <50% of est energy needs for >5days, sev fat/muscle dep,  2+ L hand edema

## 2022-01-28 NOTE — DIETITIAN NUTRITION RISK NOTIFICATION - TREATMENT: THE FOLLOWING DIET HAS BEEN RECOMMENDED
Diet, Pureed:   Consistent Carbohydrate {No Snacks} (CSTCHO)  DASH/TLC {Sodium & Cholesterol Restricted} (DASH)  No Liquids (NOLIQUIDS)  Supplement Feeding Modality:  Oral  Ensure Pudding Cans or Servings Per Day:  1       Frequency:  Three Times a day (01-27-22 @ 13:20) [Active]

## 2022-01-28 NOTE — PROGRESS NOTE ADULT - ASSESSMENT
89 years old female with history of Chronic A. Fib (no longer on Xarelto due to fall risk) and Hypothyroidism presented with shortness of breath. + COVID exposure.     1) 1) Acute Respiratory Failure with Hypoxia  - secondary to COVID-19 Pneumonia  - Dexamethasone 6 mg IVP daily   - Tylenol, Albuterol and Robitussin PRN  - Continue supplemental oxygen via HFNC, wean off as tolerated, currently on 50% FiO2 and 30L  - Encourage for prone position every 2-3 hours for 30 minutes  - Incentive spirometry  - ID Consult appreciated     2) Chronic A. Fib  - Continue Cardizem  - Not on AC due to fall risk    3) Prediabetes   - HbA1c 6.3  - Accu checks and ISS    4) Hypothyroidism  - Continue Synthroid    5) Dysphagia  - ST following, recommended -- Pureed only. No liquids.     6) Hypernatremia  - Likely due to dehydration  - IVF  - Improving     7) Severe Protein Calorie Malnutrition  - Nutrition Eval noted  - Add MVI  - Continue Ensure Pudding     DVT Prophylaxis -- Lovenox SQ    GOC: Full Code. Prognosis guarded.     Dispo: LINDSEY once stable.      89 years old female with history of Chronic A. Fib (no longer on Xarelto due to fall risk) and Hypothyroidism presented with shortness of breath. + COVID exposure.     1) 1) Acute Respiratory Failure with Hypoxia  - secondary to COVID-19 Pneumonia  - Finished Remdesivir   - Dexamethasone 6 mg IVP daily   - Tylenol, Albuterol and Robitussin PRN  - Continue supplemental oxygen via HFNC, wean off as tolerated, currently on 50% FiO2 and 30L  - Encourage for prone position every 2-3 hours for 30 minutes  - Incentive spirometry  - ID Consult appreciated     2) Chronic A. Fib  - Continue Cardizem  - Not on AC due to fall risk    3) Prediabetes   - HbA1c 6.3  - Accu checks and ISS    4) Hypothyroidism  - Continue Synthroid    5) Dysphagia  - ST following, recommended -- Pureed only. No liquids.     6) Hypernatremia  - Likely due to dehydration  - IVF  - Improving     7) Severe Protein Calorie Malnutrition  - Nutrition Eval noted  - Add MVI  - Continue Ensure Pudding     DVT Prophylaxis -- Lovenox SQ    GOC: Full Code. Prognosis guarded.     Dispo: LINDSEY once stable.      89 years old female with history of Chronic A. Fib (no longer on Xarelto due to fall risk) and Hypothyroidism presented with shortness of breath. + COVID exposure.     1) Acute Respiratory Failure with Hypoxia  - secondary to COVID-19 Pneumonia  - Finished Remdesivir   - Dexamethasone 6 mg IVP daily   - Tylenol, Albuterol and Robitussin PRN  - Continue supplemental oxygen via HFNC, wean off as tolerated, currently on 50% FiO2 and 30L  - Encourage for prone position every 2-3 hours for 30 minutes  - Incentive spirometry  - ID Consult appreciated     2) Chronic A. Fib  - Continue Cardizem  - Not on AC due to fall risk    3) Prediabetes   - HbA1c 6.3  - Accu checks and ISS    4) Hypothyroidism  - Continue Synthroid    5) Dysphagia  - ST following, recommended -- Pureed only. No liquids.     6) Hypernatremia  - Likely due to dehydration  - IVF  - Improving     7) Severe Protein Calorie Malnutrition  - Nutrition Eval noted  - Add MVI  - Continue Ensure Pudding     DVT Prophylaxis -- Lovenox SQ    GOC: Full Code. Prognosis guarded.     Dispo: LINDSEY once stable.

## 2022-01-29 LAB
ALBUMIN SERPL ELPH-MCNC: 2.4 G/DL — LOW (ref 3.3–5.2)
ALP SERPL-CCNC: 67 U/L — SIGNIFICANT CHANGE UP (ref 40–120)
ALT FLD-CCNC: 17 U/L — SIGNIFICANT CHANGE UP
ANION GAP SERPL CALC-SCNC: 12 MMOL/L — SIGNIFICANT CHANGE UP (ref 5–17)
AST SERPL-CCNC: 15 U/L — SIGNIFICANT CHANGE UP
BASOPHILS # BLD AUTO: 0.01 K/UL — SIGNIFICANT CHANGE UP (ref 0–0.2)
BASOPHILS NFR BLD AUTO: 0.1 % — SIGNIFICANT CHANGE UP (ref 0–2)
BILIRUB SERPL-MCNC: 0.4 MG/DL — SIGNIFICANT CHANGE UP (ref 0.4–2)
BUN SERPL-MCNC: 24.3 MG/DL — HIGH (ref 8–20)
CALCIUM SERPL-MCNC: 8.1 MG/DL — LOW (ref 8.6–10.2)
CHLORIDE SERPL-SCNC: 106 MMOL/L — SIGNIFICANT CHANGE UP (ref 98–107)
CO2 SERPL-SCNC: 25 MMOL/L — SIGNIFICANT CHANGE UP (ref 22–29)
CREAT SERPL-MCNC: 0.48 MG/DL — LOW (ref 0.5–1.3)
CRP SERPL-MCNC: 12 MG/L — HIGH
EOSINOPHIL # BLD AUTO: 0 K/UL — SIGNIFICANT CHANGE UP (ref 0–0.5)
EOSINOPHIL NFR BLD AUTO: 0 % — SIGNIFICANT CHANGE UP (ref 0–6)
FERRITIN SERPL-MCNC: 242 NG/ML — HIGH (ref 15–150)
GLUCOSE BLDC GLUCOMTR-MCNC: 161 MG/DL — HIGH (ref 70–99)
GLUCOSE BLDC GLUCOMTR-MCNC: 164 MG/DL — HIGH (ref 70–99)
GLUCOSE BLDC GLUCOMTR-MCNC: 213 MG/DL — HIGH (ref 70–99)
GLUCOSE BLDC GLUCOMTR-MCNC: 237 MG/DL — HIGH (ref 70–99)
GLUCOSE SERPL-MCNC: 138 MG/DL — HIGH (ref 70–99)
HCT VFR BLD CALC: 44.3 % — SIGNIFICANT CHANGE UP (ref 34.5–45)
HGB BLD-MCNC: 14.1 G/DL — SIGNIFICANT CHANGE UP (ref 11.5–15.5)
IMM GRANULOCYTES NFR BLD AUTO: 1 % — SIGNIFICANT CHANGE UP (ref 0–1.5)
LYMPHOCYTES # BLD AUTO: 0.27 K/UL — LOW (ref 1–3.3)
LYMPHOCYTES # BLD AUTO: 4 % — LOW (ref 13–44)
MCHC RBC-ENTMCNC: 29.9 PG — SIGNIFICANT CHANGE UP (ref 27–34)
MCHC RBC-ENTMCNC: 31.8 GM/DL — LOW (ref 32–36)
MCV RBC AUTO: 94.1 FL — SIGNIFICANT CHANGE UP (ref 80–100)
MONOCYTES # BLD AUTO: 0.46 K/UL — SIGNIFICANT CHANGE UP (ref 0–0.9)
MONOCYTES NFR BLD AUTO: 6.8 % — SIGNIFICANT CHANGE UP (ref 2–14)
NEUTROPHILS # BLD AUTO: 5.93 K/UL — SIGNIFICANT CHANGE UP (ref 1.8–7.4)
NEUTROPHILS NFR BLD AUTO: 88.1 % — HIGH (ref 43–77)
PLATELET # BLD AUTO: 160 K/UL — SIGNIFICANT CHANGE UP (ref 150–400)
POTASSIUM SERPL-MCNC: 3.6 MMOL/L — SIGNIFICANT CHANGE UP (ref 3.5–5.3)
POTASSIUM SERPL-SCNC: 3.6 MMOL/L — SIGNIFICANT CHANGE UP (ref 3.5–5.3)
PROT SERPL-MCNC: 5.1 G/DL — LOW (ref 6.6–8.7)
RBC # BLD: 4.71 M/UL — SIGNIFICANT CHANGE UP (ref 3.8–5.2)
RBC # FLD: 13 % — SIGNIFICANT CHANGE UP (ref 10.3–14.5)
SODIUM SERPL-SCNC: 143 MMOL/L — SIGNIFICANT CHANGE UP (ref 135–145)
WBC # BLD: 6.74 K/UL — SIGNIFICANT CHANGE UP (ref 3.8–10.5)
WBC # FLD AUTO: 6.74 K/UL — SIGNIFICANT CHANGE UP (ref 3.8–10.5)

## 2022-01-29 PROCEDURE — 99233 SBSQ HOSP IP/OBS HIGH 50: CPT

## 2022-01-29 RX ADMIN — Medication 2: at 17:57

## 2022-01-29 RX ADMIN — SODIUM CHLORIDE 100 MILLILITER(S): 9 INJECTION, SOLUTION INTRAVENOUS at 09:34

## 2022-01-29 RX ADMIN — Medication 1 TABLET(S): at 12:09

## 2022-01-29 RX ADMIN — ENOXAPARIN SODIUM 40 MILLIGRAM(S): 100 INJECTION SUBCUTANEOUS at 12:08

## 2022-01-29 RX ADMIN — TAMSULOSIN HYDROCHLORIDE 0.4 MILLIGRAM(S): 0.4 CAPSULE ORAL at 21:11

## 2022-01-29 RX ADMIN — Medication 6 MILLIGRAM(S): at 05:16

## 2022-01-29 RX ADMIN — Medication 1: at 12:05

## 2022-01-29 RX ADMIN — DIPHENHYDRAMINE HYDROCHLORIDE AND LIDOCAINE HYDROCHLORIDE AND ALUMINUM HYDROXIDE AND MAGNESIUM HYDRO 5 MILLILITER(S): KIT at 05:16

## 2022-01-29 RX ADMIN — Medication 1: at 08:36

## 2022-01-29 RX ADMIN — PANTOPRAZOLE SODIUM 40 MILLIGRAM(S): 20 TABLET, DELAYED RELEASE ORAL at 12:08

## 2022-01-29 RX ADMIN — Medication 50 MICROGRAM(S): at 05:16

## 2022-01-29 RX ADMIN — DIPHENHYDRAMINE HYDROCHLORIDE AND LIDOCAINE HYDROCHLORIDE AND ALUMINUM HYDROXIDE AND MAGNESIUM HYDRO 5 MILLILITER(S): KIT at 17:57

## 2022-01-29 RX ADMIN — SODIUM CHLORIDE 100 MILLILITER(S): 9 INJECTION, SOLUTION INTRAVENOUS at 21:11

## 2022-01-29 RX ADMIN — Medication 120 MILLIGRAM(S): at 05:16

## 2022-01-29 RX ADMIN — REMDESIVIR 500 MILLIGRAM(S): 5 INJECTION INTRAVENOUS at 13:53

## 2022-01-29 NOTE — PROGRESS NOTE ADULT - ASSESSMENT
89 years old female with history of Chronic A. Fib (no longer on Xarelto due to fall risk) and Hypothyroidism presented with shortness of breath. + COVID exposure.     1) Acute Respiratory Failure with Hypoxia  - secondary to COVID-19 Pneumonia  - Continue Remdesivir (extended course)   - Dexamethasone 6 mg IVP daily   - Tylenol, Albuterol and Robitussin PRN  - Continue supplemental oxygen via HFNC, wean off as tolerated, weaned down to 30% FiO2 and 30L this morning   - Encourage for prone position every 2-3 hours for 30 minutes  - Incentive spirometry  - ID follow up noted: no longer a candidate for Actemra due to duration of symptoms     2) Chronic A. Fib  - Continue Cardizem  - Not on AC due to fall risk    3) Prediabetes   - HbA1c 6.3  - Accu checks and ISS    4) Hypothyroidism  - Continue Synthroid    5) Dysphagia  - ST following, recommended -- Pureed only. No liquids.     6) Hypernatremia  - Likely due to dehydration  - Improved with IVF    7) Severe Protein Calorie Malnutrition  - Nutrition Eval noted  - Added MVI  - Continue Ensure Pudding     DVT Prophylaxis -- Lovenox SQ    GOC: Full Code. Prognosis guarded.     Dispo: L[facility] once stable.

## 2022-01-30 LAB
ALBUMIN SERPL ELPH-MCNC: 2.5 G/DL — LOW (ref 3.3–5.2)
ALP SERPL-CCNC: 69 U/L — SIGNIFICANT CHANGE UP (ref 40–120)
ALT FLD-CCNC: 16 U/L — SIGNIFICANT CHANGE UP
ANION GAP SERPL CALC-SCNC: 11 MMOL/L — SIGNIFICANT CHANGE UP (ref 5–17)
AST SERPL-CCNC: 14 U/L — SIGNIFICANT CHANGE UP
BILIRUB SERPL-MCNC: 0.5 MG/DL — SIGNIFICANT CHANGE UP (ref 0.4–2)
BUN SERPL-MCNC: 24.1 MG/DL — HIGH (ref 8–20)
CALCIUM SERPL-MCNC: 8.1 MG/DL — LOW (ref 8.6–10.2)
CHLORIDE SERPL-SCNC: 106 MMOL/L — SIGNIFICANT CHANGE UP (ref 98–107)
CO2 SERPL-SCNC: 25 MMOL/L — SIGNIFICANT CHANGE UP (ref 22–29)
CREAT SERPL-MCNC: 0.46 MG/DL — LOW (ref 0.5–1.3)
GLUCOSE BLDC GLUCOMTR-MCNC: 131 MG/DL — HIGH (ref 70–99)
GLUCOSE BLDC GLUCOMTR-MCNC: 174 MG/DL — HIGH (ref 70–99)
GLUCOSE BLDC GLUCOMTR-MCNC: 221 MG/DL — HIGH (ref 70–99)
GLUCOSE BLDC GLUCOMTR-MCNC: 256 MG/DL — HIGH (ref 70–99)
GLUCOSE SERPL-MCNC: 162 MG/DL — HIGH (ref 70–99)
MAGNESIUM SERPL-MCNC: 2.1 MG/DL — SIGNIFICANT CHANGE UP (ref 1.6–2.6)
POTASSIUM SERPL-MCNC: 3.5 MMOL/L — SIGNIFICANT CHANGE UP (ref 3.5–5.3)
POTASSIUM SERPL-SCNC: 3.5 MMOL/L — SIGNIFICANT CHANGE UP (ref 3.5–5.3)
PROT SERPL-MCNC: 5.1 G/DL — LOW (ref 6.6–8.7)
SODIUM SERPL-SCNC: 142 MMOL/L — SIGNIFICANT CHANGE UP (ref 135–145)

## 2022-01-30 PROCEDURE — 99233 SBSQ HOSP IP/OBS HIGH 50: CPT

## 2022-01-30 RX ORDER — LANOLIN ALCOHOL/MO/W.PET/CERES
5 CREAM (GRAM) TOPICAL ONCE
Refills: 0 | Status: COMPLETED | OUTPATIENT
Start: 2022-01-30 | End: 2022-01-30

## 2022-01-30 RX ADMIN — Medication 1 TABLET(S): at 11:37

## 2022-01-30 RX ADMIN — SODIUM CHLORIDE 100 MILLILITER(S): 9 INJECTION, SOLUTION INTRAVENOUS at 05:13

## 2022-01-30 RX ADMIN — PANTOPRAZOLE SODIUM 40 MILLIGRAM(S): 20 TABLET, DELAYED RELEASE ORAL at 11:36

## 2022-01-30 RX ADMIN — Medication 5 MILLIGRAM(S): at 00:57

## 2022-01-30 RX ADMIN — Medication 1: at 17:51

## 2022-01-30 RX ADMIN — DIPHENHYDRAMINE HYDROCHLORIDE AND LIDOCAINE HYDROCHLORIDE AND ALUMINUM HYDROXIDE AND MAGNESIUM HYDRO 5 MILLILITER(S): KIT at 17:55

## 2022-01-30 RX ADMIN — REMDESIVIR 500 MILLIGRAM(S): 5 INJECTION INTRAVENOUS at 14:51

## 2022-01-30 RX ADMIN — Medication 50 MICROGRAM(S): at 05:13

## 2022-01-30 RX ADMIN — SODIUM CHLORIDE 75 MILLILITER(S): 9 INJECTION, SOLUTION INTRAVENOUS at 19:19

## 2022-01-30 RX ADMIN — Medication 1: at 21:23

## 2022-01-30 RX ADMIN — ENOXAPARIN SODIUM 40 MILLIGRAM(S): 100 INJECTION SUBCUTANEOUS at 11:37

## 2022-01-30 RX ADMIN — DIPHENHYDRAMINE HYDROCHLORIDE AND LIDOCAINE HYDROCHLORIDE AND ALUMINUM HYDROXIDE AND MAGNESIUM HYDRO 5 MILLILITER(S): KIT at 05:13

## 2022-01-30 RX ADMIN — Medication 2: at 13:35

## 2022-01-30 RX ADMIN — Medication 120 MILLIGRAM(S): at 05:12

## 2022-01-30 RX ADMIN — Medication 6 MILLIGRAM(S): at 05:13

## 2022-01-31 LAB
ALBUMIN SERPL ELPH-MCNC: 2.7 G/DL — LOW (ref 3.3–5.2)
ALP SERPL-CCNC: 74 U/L — SIGNIFICANT CHANGE UP (ref 40–120)
ALT FLD-CCNC: 16 U/L — SIGNIFICANT CHANGE UP
ANION GAP SERPL CALC-SCNC: 12 MMOL/L — SIGNIFICANT CHANGE UP (ref 5–17)
AST SERPL-CCNC: 14 U/L — SIGNIFICANT CHANGE UP
BASOPHILS # BLD AUTO: 0.02 K/UL — SIGNIFICANT CHANGE UP (ref 0–0.2)
BASOPHILS NFR BLD AUTO: 0.2 % — SIGNIFICANT CHANGE UP (ref 0–2)
BILIRUB SERPL-MCNC: 0.5 MG/DL — SIGNIFICANT CHANGE UP (ref 0.4–2)
BUN SERPL-MCNC: 24 MG/DL — HIGH (ref 8–20)
CALCIUM SERPL-MCNC: 8.2 MG/DL — LOW (ref 8.6–10.2)
CHLORIDE SERPL-SCNC: 105 MMOL/L — SIGNIFICANT CHANGE UP (ref 98–107)
CO2 SERPL-SCNC: 26 MMOL/L — SIGNIFICANT CHANGE UP (ref 22–29)
CREAT SERPL-MCNC: 0.42 MG/DL — LOW (ref 0.5–1.3)
EOSINOPHIL # BLD AUTO: 0 K/UL — SIGNIFICANT CHANGE UP (ref 0–0.5)
EOSINOPHIL NFR BLD AUTO: 0 % — SIGNIFICANT CHANGE UP (ref 0–6)
GLUCOSE BLDC GLUCOMTR-MCNC: 144 MG/DL — HIGH (ref 70–99)
GLUCOSE BLDC GLUCOMTR-MCNC: 184 MG/DL — HIGH (ref 70–99)
GLUCOSE BLDC GLUCOMTR-MCNC: 230 MG/DL — HIGH (ref 70–99)
GLUCOSE BLDC GLUCOMTR-MCNC: 245 MG/DL — HIGH (ref 70–99)
GLUCOSE SERPL-MCNC: 128 MG/DL — HIGH (ref 70–99)
HCT VFR BLD CALC: 49.5 % — HIGH (ref 34.5–45)
HGB BLD-MCNC: 16.4 G/DL — HIGH (ref 11.5–15.5)
IMM GRANULOCYTES NFR BLD AUTO: 0.6 % — SIGNIFICANT CHANGE UP (ref 0–1.5)
LYMPHOCYTES # BLD AUTO: 0.54 K/UL — LOW (ref 1–3.3)
LYMPHOCYTES # BLD AUTO: 6.5 % — LOW (ref 13–44)
MCHC RBC-ENTMCNC: 30.8 PG — SIGNIFICANT CHANGE UP (ref 27–34)
MCHC RBC-ENTMCNC: 33.1 GM/DL — SIGNIFICANT CHANGE UP (ref 32–36)
MCV RBC AUTO: 92.9 FL — SIGNIFICANT CHANGE UP (ref 80–100)
MONOCYTES # BLD AUTO: 0.85 K/UL — SIGNIFICANT CHANGE UP (ref 0–0.9)
MONOCYTES NFR BLD AUTO: 10.3 % — SIGNIFICANT CHANGE UP (ref 2–14)
NEUTROPHILS # BLD AUTO: 6.83 K/UL — SIGNIFICANT CHANGE UP (ref 1.8–7.4)
NEUTROPHILS NFR BLD AUTO: 82.4 % — HIGH (ref 43–77)
PLATELET # BLD AUTO: SIGNIFICANT CHANGE UP K/UL (ref 150–400)
POTASSIUM SERPL-MCNC: 3.6 MMOL/L — SIGNIFICANT CHANGE UP (ref 3.5–5.3)
POTASSIUM SERPL-SCNC: 3.6 MMOL/L — SIGNIFICANT CHANGE UP (ref 3.5–5.3)
PROT SERPL-MCNC: 5.5 G/DL — LOW (ref 6.6–8.7)
RBC # BLD: 5.33 M/UL — HIGH (ref 3.8–5.2)
RBC # FLD: 12.7 % — SIGNIFICANT CHANGE UP (ref 10.3–14.5)
SODIUM SERPL-SCNC: 143 MMOL/L — SIGNIFICANT CHANGE UP (ref 135–145)
WBC # BLD: 8.29 K/UL — SIGNIFICANT CHANGE UP (ref 3.8–10.5)
WBC # FLD AUTO: 8.29 K/UL — SIGNIFICANT CHANGE UP (ref 3.8–10.5)

## 2022-01-31 PROCEDURE — 93931 UPPER EXTREMITY STUDY: CPT | Mod: 26,LT

## 2022-01-31 PROCEDURE — 93971 EXTREMITY STUDY: CPT | Mod: 26,LT

## 2022-01-31 PROCEDURE — 99232 SBSQ HOSP IP/OBS MODERATE 35: CPT

## 2022-01-31 PROCEDURE — 99233 SBSQ HOSP IP/OBS HIGH 50: CPT

## 2022-01-31 RX ORDER — DILTIAZEM HCL 120 MG
60 CAPSULE, EXT RELEASE 24 HR ORAL ONCE
Refills: 0 | Status: COMPLETED | OUTPATIENT
Start: 2022-01-31 | End: 2022-01-31

## 2022-01-31 RX ORDER — POTASSIUM CHLORIDE 20 MEQ
40 PACKET (EA) ORAL ONCE
Refills: 0 | Status: COMPLETED | OUTPATIENT
Start: 2022-01-31 | End: 2022-01-31

## 2022-01-31 RX ORDER — DILTIAZEM HCL 120 MG
60 CAPSULE, EXT RELEASE 24 HR ORAL
Refills: 0 | Status: DISCONTINUED | OUTPATIENT
Start: 2022-01-31 | End: 2022-02-02

## 2022-01-31 RX ADMIN — SODIUM CHLORIDE 75 MILLILITER(S): 9 INJECTION, SOLUTION INTRAVENOUS at 00:51

## 2022-01-31 RX ADMIN — Medication 50 MICROGRAM(S): at 06:54

## 2022-01-31 RX ADMIN — Medication 1 TABLET(S): at 11:54

## 2022-01-31 RX ADMIN — DIPHENHYDRAMINE HYDROCHLORIDE AND LIDOCAINE HYDROCHLORIDE AND ALUMINUM HYDROXIDE AND MAGNESIUM HYDRO 5 MILLILITER(S): KIT at 06:57

## 2022-01-31 RX ADMIN — Medication 2: at 17:50

## 2022-01-31 RX ADMIN — ENOXAPARIN SODIUM 40 MILLIGRAM(S): 100 INJECTION SUBCUTANEOUS at 11:54

## 2022-01-31 RX ADMIN — Medication 60 MILLIGRAM(S): at 12:35

## 2022-01-31 RX ADMIN — PANTOPRAZOLE SODIUM 40 MILLIGRAM(S): 20 TABLET, DELAYED RELEASE ORAL at 11:54

## 2022-01-31 RX ADMIN — REMDESIVIR 500 MILLIGRAM(S): 5 INJECTION INTRAVENOUS at 12:42

## 2022-01-31 RX ADMIN — DIPHENHYDRAMINE HYDROCHLORIDE AND LIDOCAINE HYDROCHLORIDE AND ALUMINUM HYDROXIDE AND MAGNESIUM HYDRO 5 MILLILITER(S): KIT at 17:52

## 2022-01-31 RX ADMIN — Medication 60 MILLIGRAM(S): at 17:52

## 2022-01-31 RX ADMIN — Medication 2: at 12:35

## 2022-01-31 RX ADMIN — Medication 6 MILLIGRAM(S): at 06:54

## 2022-01-31 NOTE — PROGRESS NOTE ADULT - ASSESSMENT
89 years old female with history of Chronic A. Fib (no longer on Xarelto due to fall risk) and Hypothyroidism presented with shortness of breath. + COVID exposure.     1) Acute Respiratory Failure with Hypoxia  - secondary to COVID-19 Pneumonia  - Continue Remdesivir (extended course)   - Dexamethasone 6 mg IVP daily   - Tylenol, Albuterol and Robitussin PRN  - Continue supplemental oxygen via NC, wean off as tolerated, weaned down to 3L this morning    - Encourage for prone position every 2-3 hours for 30 minutes if able to cooperate   - Incentive spirometry  - ID follow up noted: no longer a candidate for Actemra due to duration of symptoms     2) Chronic A. Fib  - Continue Cardizem  - Not on AC due to fall risk    3) Prediabetes   - HbA1c 6.3  - Accu checks and ISS    4) Hypothyroidism  - Continue Synthroid    5) Dysphagia  - ST following, recommended -- Pureed only. No liquids.     6) Hypernatremia  - Likely due to dehydration  - Improved with IVF    7) Severe Protein Calorie Malnutrition  - Nutrition Eval noted  - Added MVI  - Continue Ensure Pudding     DVT Prophylaxis -- Lovenox SQ    GOC: Full Code. Prognosis guarded.     Dispo: L[facility] once stable.

## 2022-01-31 NOTE — PROGRESS NOTE ADULT - ASSESSMENT
89y  Female with h/o Afib (no longer on Xarelto due to risk of fall), hypothyroidism. Patient came to the ED for shortness of breath. Patient has shortness of breath, fever, cough, headache while at home and was noted to be hypoxic to 83% on RA on day of presentation which prompted ED visit. Patient's daughter whom she lives with had COVID 2 weeks prior. Patient was not checked for COVID, and she is not vaccinated against COVID. Patient was admitted on 1/23 with Acute hypoxic respiratory failure. Started on Remdesivir. Now with worsening hypoxia.      Acute Hypoxic respiratory failure  COVID-19 infection  B/L Pneumonia   cough  shortness of breath      - COVID 19 PCR positive   - Blood cultures no growth   - CT Chest with no PE. + PNA   - Continue supportive care measures  - continue to trend inflammatory markers.   - trend CBC with diff, CMP,  CRP, Ferritin, D Dimer  procalcitonin q 48hours  - Avoid antibiotics unless there is a concern for a bacterial infection  - May consider vitamin C, thiamine and zinc (note lack of evidence to support benefit with COVID 19)  - Continue Remdesivir 200mg IV x1 followed by 100mg IV daily, will extend course which is due to complete on 2/2/22  - Dexamethasone Daily while on Remdesivir   - No longer a candidate for Actemra due to duration of symptoms   - Not a candidate for Casirivimab/Imdevimab or Sotrovimab   - Follow up cultures  - Trend Fever  - Trend WBC      Will sign off. please call PRN.     d/w Dr Owusu

## 2022-01-31 NOTE — CHART NOTE - NSCHARTNOTEFT_GEN_A_CORE
Provider first called by RN at 16:01 for left hand ring finger swelling secondary to wedding band.  Per RN attempted remove ring with Aquaphor and overnight RN attempted to remove ring with soap and water.  Immediately went to go assess patient and at bedside - left ring finger is noted to be swollen, purple with delayed capillary refill, no cyanosis appreciated.  Immediately called MD who had NOT been made aware of this issue prior during this hospital admission.      Discussed risks vs. benefits, given high risk for possible loss of left ring finger secondary to signs / symptoms of poor perfusion appreciated on exam - decision to cut the ring off was made.   Attempted to call family twice, without answer.  Provider and RN immediately went to go obtain a ring cutter from the ED, ring cutter removed at bedside by RN with provider present.      Left ring successfully removed, patient tolerated procedure well without issue.      Physical Exam:  Extremities: LUE swollen throughout, left hand swollen with significant swelling appreciated on left ring finger secondary to wedding band.  2+ radial pulses appreciated b/l,  Once ring removed, normal capillary refill appreciated throughout left hand.  No IVs currently present in LUE.  RUE WNL     A/P:  -elevate left upper extremity  -apply warm compresses to left hand and wrist q4 hours  -Urgent LUE Arterial and Venous Doppler ordered  -LUE will be pink banded - do NOT use LUE for blood draws, BP or IV access until dopplers are completed and reviewed by primary team   -If LUE dopplers are negative, RN is to please ace wrap LUE to help decrease swelling   -RN to call provider immediately if LUE shows any sign worsening swelling, perfusion, decreased capillary refill, decreased pulses or decreased capillary refill   -RN to call provider for any acute changes / questions / concerns  [phone]      The above patient and plan was discussed with Dr. Owusu and RN. Will Sign out to the Night Medicine team to please follow-up. Provider first called by RN at 16:01 for left hand and left ring finger swelling secondary to wedding band.  Per RN -attempted remove ring with Aquaphor this afternoon and overnight RN attempted to remove ring with soap and water without success without provider notification. Immediately went to go assess patient at bedside - left hand is noted to be markedly swollen, left ring finger is noted to be swollen, purple with delayed capillary refill 4 seconds, no cyanosis appreciated.  Immediately called MD who had NOT been made aware of this issue prior during this hospital admission.  ROS unable to be completed secondary to patient's MS and dementia.    Discussed risks vs. benefits with MD, given high risk for complications secondary to signs / symptoms of poor perfusion appreciated on exam - decision to cut the ring off was made.   Attempted to call family twice without answer.  Provider and RN immediately went to go obtain a ring cutter from the ED, ring removed at bedside by RN with provider present.      Left ring successfully removed, patient tolerated procedure well without issue.      Physical Exam:  Extremities: LUE swollen throughout, left hand swollen with significant swelling appreciated on left ring finger secondary to wedding band.  2+ radial pulses appreciated b/l,  Once ring removed, normal capillary refill appreciated throughout left hand. Left 4th digit coloration improved slightly.  No IVs currently present in LUE.  RUE WNL.     A/P:  -elevate left upper extremity  -apply warm compresses to left hand and wrist q4 hours  -Urgent LUE Arterial and Venous Doppler ordered, patient is to travel with monitor only   -LUE will be pink banded - do NOT use LUE for blood draws, BP or IV access until dopplers are completed and reviewed by primary team   -If LUE dopplers are negative, RN is to please ace wrap LUE to help decrease swelling -please follow-up with PA prior to doing this   -RN to call provider immediately if LUE shows any sign worsening swelling, decreased perfusion, decreased capillary refill, decreased pulses, worsening skin color changes and/or cyanosis.   -RN to call provider for any acute changes / questions / concerns  [phone]      The above patient and plan was discussed with Dr. Owusu and RN. Will Sign out to the Night Medicine team to please follow-up. Provider first called by RN at 16:01 for left hand and left ring finger swelling secondary to wedding band.  Per RN -attempted remove ring with Aquaphor this afternoon and overnight RN attempted to remove ring with soap and water without success without provider notification. Immediately went to go assess patient at bedside - left hand is noted to be markedly swollen, left ring finger is noted to be swollen, purple with delayed capillary refill 4 seconds, no cyanosis appreciated.  Immediately called MD who had NOT been made aware of this issue prior during this hospital admission.  ROS unable to be completed secondary to patient's MS and dementia.    Discussed risks vs. benefits with MD, given high risk for complications secondary to signs / symptoms of poor perfusion appreciated on exam - decision to cut the ring off was made.   Attempted to call family twice without answer.  Provider and RN immediately went to go obtain a ring cutter from the ED, ring removed at bedside by RN with provider present.      Left ring successfully removed, patient tolerated procedure well without issue.      Physical Exam:  Extremities: LUE swollen throughout, left hand swollen with significant swelling appreciated on left ring finger secondary to wedding band.  2+ radial pulses appreciated b/l,  Once ring removed, normal capillary refill appreciated throughout left hand. Left 4th digit coloration improved slightly.  No IVs currently present in LUE.  RUE WNL.     A/P:  -elevate left upper extremity  -apply warm compresses to left hand and wrist q4 hours  -Urgent LUE Arterial and Venous Doppler ordered, patient is to travel with monitor only   -LUE will be pink banded - do NOT use LUE for blood draws, BP or IV access until dopplers are completed and reviewed by primary team   -If LUE dopplers are negative, RN is to please ace wrap LUE to help decrease swelling -please follow-up with PA prior to doing this   -wedding band placed in a plastic bag and placed in patient's chart by RN  -RN to call provider immediately if LUE shows any sign worsening swelling, decreased perfusion, decreased capillary refill, decreased pulses, worsening skin color changes and/or cyanosis.   -RN to call provider for any acute changes / questions / concerns  [phone]      The above patient and plan was discussed with Dr. Owusu and RN. Will Sign out to the Night Medicine team to please follow-up.

## 2022-02-01 LAB
ALBUMIN SERPL ELPH-MCNC: 2.5 G/DL — LOW (ref 3.3–5.2)
ALP SERPL-CCNC: 65 U/L — SIGNIFICANT CHANGE UP (ref 40–120)
ALT FLD-CCNC: 15 U/L — SIGNIFICANT CHANGE UP
ANION GAP SERPL CALC-SCNC: 12 MMOL/L — SIGNIFICANT CHANGE UP (ref 5–17)
AST SERPL-CCNC: 12 U/L — SIGNIFICANT CHANGE UP
BILIRUB SERPL-MCNC: 0.5 MG/DL — SIGNIFICANT CHANGE UP (ref 0.4–2)
BUN SERPL-MCNC: 25.9 MG/DL — HIGH (ref 8–20)
CALCIUM SERPL-MCNC: 7.9 MG/DL — LOW (ref 8.6–10.2)
CHLORIDE SERPL-SCNC: 105 MMOL/L — SIGNIFICANT CHANGE UP (ref 98–107)
CO2 SERPL-SCNC: 25 MMOL/L — SIGNIFICANT CHANGE UP (ref 22–29)
CREAT SERPL-MCNC: 0.46 MG/DL — LOW (ref 0.5–1.3)
GLUCOSE BLDC GLUCOMTR-MCNC: 136 MG/DL — HIGH (ref 70–99)
GLUCOSE BLDC GLUCOMTR-MCNC: 154 MG/DL — HIGH (ref 70–99)
GLUCOSE BLDC GLUCOMTR-MCNC: 155 MG/DL — HIGH (ref 70–99)
GLUCOSE BLDC GLUCOMTR-MCNC: 178 MG/DL — HIGH (ref 70–99)
GLUCOSE SERPL-MCNC: 137 MG/DL — HIGH (ref 70–99)
POTASSIUM SERPL-MCNC: 3.8 MMOL/L — SIGNIFICANT CHANGE UP (ref 3.5–5.3)
POTASSIUM SERPL-SCNC: 3.8 MMOL/L — SIGNIFICANT CHANGE UP (ref 3.5–5.3)
PROT SERPL-MCNC: 4.8 G/DL — LOW (ref 6.6–8.7)
SODIUM SERPL-SCNC: 142 MMOL/L — SIGNIFICANT CHANGE UP (ref 135–145)

## 2022-02-01 PROCEDURE — 99233 SBSQ HOSP IP/OBS HIGH 50: CPT

## 2022-02-01 PROCEDURE — 99497 ADVNCD CARE PLAN 30 MIN: CPT | Mod: 25

## 2022-02-01 PROCEDURE — 70450 CT HEAD/BRAIN W/O DYE: CPT | Mod: 26

## 2022-02-01 RX ADMIN — TAMSULOSIN HYDROCHLORIDE 0.4 MILLIGRAM(S): 0.4 CAPSULE ORAL at 21:54

## 2022-02-01 RX ADMIN — DIPHENHYDRAMINE HYDROCHLORIDE AND LIDOCAINE HYDROCHLORIDE AND ALUMINUM HYDROXIDE AND MAGNESIUM HYDRO 5 MILLILITER(S): KIT at 17:15

## 2022-02-01 RX ADMIN — Medication 50 MICROGRAM(S): at 05:28

## 2022-02-01 RX ADMIN — REMDESIVIR 500 MILLIGRAM(S): 5 INJECTION INTRAVENOUS at 13:58

## 2022-02-01 RX ADMIN — Medication 1: at 09:09

## 2022-02-01 RX ADMIN — DIPHENHYDRAMINE HYDROCHLORIDE AND LIDOCAINE HYDROCHLORIDE AND ALUMINUM HYDROXIDE AND MAGNESIUM HYDRO 5 MILLILITER(S): KIT at 05:29

## 2022-02-01 RX ADMIN — Medication 60 MILLIGRAM(S): at 05:28

## 2022-02-01 RX ADMIN — Medication 1: at 17:15

## 2022-02-01 RX ADMIN — SODIUM CHLORIDE 75 MILLILITER(S): 9 INJECTION, SOLUTION INTRAVENOUS at 05:31

## 2022-02-01 RX ADMIN — PANTOPRAZOLE SODIUM 40 MILLIGRAM(S): 20 TABLET, DELAYED RELEASE ORAL at 12:50

## 2022-02-01 RX ADMIN — ENOXAPARIN SODIUM 40 MILLIGRAM(S): 100 INJECTION SUBCUTANEOUS at 12:50

## 2022-02-01 RX ADMIN — Medication 1 TABLET(S): at 12:50

## 2022-02-01 RX ADMIN — Medication 60 MILLIGRAM(S): at 18:27

## 2022-02-01 RX ADMIN — Medication 6 MILLIGRAM(S): at 05:28

## 2022-02-01 NOTE — CHART NOTE - NSCHARTNOTEFT_GEN_A_CORE
Source: Patient [ ]  Family [ ]   other [x ]EMR, pt sleeping    Current Diet: puree, Dash, consistent CHO, no liquids with ensure puddings TID    Pt is a total feed    PO intake:  < 50% [x ]   50-75%  [ ]   %  [ ]  other :    Source for PO intake [ ] Patient [ ] family [x ] chart [ ] staff [ ] other    Enteral /Parenteral Nutrition:     Current Weight: 104# bedscale today. 87.9# 1/24, question accuracy  3+ right hand, 4 + left hand    % Weight Change     Pertinent Medications: MEDICATIONS  (STANDING):  dexAMETHasone  Injectable 6 milliGRAM(s) IV Push daily  dextrose 40% Gel 15 Gram(s) Oral once  dextrose 5%. 1000 milliLiter(s) (50 mL/Hr) IV Continuous <Continuous>  dextrose 5%. 1000 milliLiter(s) (100 mL/Hr) IV Continuous <Continuous>  dextrose 50% Injectable 25 Gram(s) IV Push once  dextrose 50% Injectable 12.5 Gram(s) IV Push once  dextrose 50% Injectable 25 Gram(s) IV Push once  diltiazem    Tablet 60 milliGRAM(s) Oral two times a day  enoxaparin Injectable 40 milliGRAM(s) SubCutaneous daily  FIRST- Mouthwash  BLM 5 milliLiter(s) Swish and Spit two times a day  glucagon  Injectable 1 milliGRAM(s) IntraMuscular once  insulin lispro (ADMELOG) corrective regimen sliding scale   SubCutaneous three times a day before meals  insulin lispro (ADMELOG) corrective regimen sliding scale   SubCutaneous at bedtime  lactated ringers. 1000 milliLiter(s) (75 mL/Hr) IV Continuous <Continuous>  levothyroxine 50 MICROGram(s) Oral daily  multivitamin 1 Tablet(s) Oral daily  pantoprazole  Injectable 40 milliGRAM(s) IV Push daily  potassium chloride   Powder 40 milliEquivalent(s) Oral once  remdesivir  IVPB 100 milliGRAM(s) IV Intermittent every 24 hours  tamsulosin 0.4 milliGRAM(s) Oral at bedtime    MEDICATIONS  (PRN):  acetaminophen     Tablet .. 650 milliGRAM(s) Oral every 4 hours PRN Temp greater or equal to 38.5C (101.3F)  ALBUTerol    90 MICROgram(s) HFA Inhaler 2 Puff(s) Inhalation every 4 hours PRN Shortness of Breath and/or Wheezing  benzonatate 100 milliGRAM(s) Oral three times a day PRN Cough  guaifenesin/dextromethorphan Oral Liquid 10 milliLiter(s) Oral every 4 hours PRN Cough    Pertinent Labs: CBC Full  -  ( 31 Jan 2022 07:09 )  WBC Count : 8.29 K/uL  RBC Count : 5.33 M/uL  Hemoglobin : 16.4 g/dL  Hematocrit : 49.5 %  Platelet Count - Automated : Clumped K/uL  Mean Cell Volume : 92.9 fl  Mean Cell Hemoglobin : 30.8 pg  Mean Cell Hemoglobin Concentration : 33.1 gm/dL  Auto Neutrophil # : 6.83 K/uL  Auto Lymphocyte # : 0.54 K/uL  Auto Monocyte # : 0.85 K/uL  Auto Eosinophil # : 0.00 K/uL  Auto Basophil # : 0.02 K/uL  Auto Neutrophil % : 82.4 %  Auto Lymphocyte % : 6.5 %  Auto Monocyte % : 10.3 %  Auto Eosinophil % : 0.0 %  Auto Basophil % : 0.2 %    02-01 Na142 mmol/L Glu 137 mg/dL<H> K+ 3.8 mmol/L Cr  0.46 mg/dL<L> BUN 25.9 mg/dL<H> Phos n/a   Alb 2.5 g/dL<L> PAB n/a             Skin:     Nutrition focused physical exam conducted - found signs of malnutrition [ ]absent [x ]present    Subcutaneous fat loss: [x ] Orbital fat pads region, [x ]Buccal fat region, [ ]Triceps region,  [ ]Ribs region    Muscle wasting: [x ]Temples region, [ ]Clavicle region, [ ]Shoulder region, [ ]Scapula region, [ ]Interosseous region,  [ ]thigh region, [ ]Calf region    Estimated Needs:   [x ] no change since previous assessment  [ ] recalculated:     Current Nutrition Diagnosis: Malnutrition Severe acute (likely chronic) related to inability to meet sufficient protein-energy in setting of COVID+, s/p wrist surgery as evidenced by meeting <50% of est energy needs for >5days, sev fat/muscle dep,  2+ L hand edema. Plan is LINDSEY when medically stable.       Recommendations: Provide magic cup BID and ensure pudding TID   continue MVI    Monitoring and Evaluation:   [x ] PO intake [x ] Tolerance to diet prescription [X] Weights  [X] Follow up per protocol [X] Labs: Source: Patient [ ]  Family [ ]   other [x ]EMR, pt sleeping    Current Diet: puree, Dash, consistent CHO, no liquids with ensure puddings TID    Pt is a total feed    PO intake:  < 50% [x ]   50-75%  [ ]   %  [ ]  other :    Source for PO intake [ ] Patient [ ] family [x ] chart [ ] staff [ ] other    Enteral /Parenteral Nutrition:     Current Weight: 104# bedscale today. 87.9# 1/24, question accuracy  3+ right hand, 4 + left hand    % Weight Change     Pertinent Medications: MEDICATIONS  (STANDING):  dexAMETHasone  Injectable 6 milliGRAM(s) IV Push daily  dextrose 40% Gel 15 Gram(s) Oral once  dextrose 5%. 1000 milliLiter(s) (50 mL/Hr) IV Continuous <Continuous>  dextrose 5%. 1000 milliLiter(s) (100 mL/Hr) IV Continuous <Continuous>  dextrose 50% Injectable 25 Gram(s) IV Push once  dextrose 50% Injectable 12.5 Gram(s) IV Push once  dextrose 50% Injectable 25 Gram(s) IV Push once  diltiazem    Tablet 60 milliGRAM(s) Oral two times a day  enoxaparin Injectable 40 milliGRAM(s) SubCutaneous daily  FIRST- Mouthwash  BLM 5 milliLiter(s) Swish and Spit two times a day  glucagon  Injectable 1 milliGRAM(s) IntraMuscular once  insulin lispro (ADMELOG) corrective regimen sliding scale   SubCutaneous three times a day before meals  insulin lispro (ADMELOG) corrective regimen sliding scale   SubCutaneous at bedtime  lactated ringers. 1000 milliLiter(s) (75 mL/Hr) IV Continuous <Continuous>  levothyroxine 50 MICROGram(s) Oral daily  multivitamin 1 Tablet(s) Oral daily  pantoprazole  Injectable 40 milliGRAM(s) IV Push daily  potassium chloride   Powder 40 milliEquivalent(s) Oral once  remdesivir  IVPB 100 milliGRAM(s) IV Intermittent every 24 hours  tamsulosin 0.4 milliGRAM(s) Oral at bedtime    MEDICATIONS  (PRN):  acetaminophen     Tablet .. 650 milliGRAM(s) Oral every 4 hours PRN Temp greater or equal to 38.5C (101.3F)  ALBUTerol    90 MICROgram(s) HFA Inhaler 2 Puff(s) Inhalation every 4 hours PRN Shortness of Breath and/or Wheezing  benzonatate 100 milliGRAM(s) Oral three times a day PRN Cough  guaifenesin/dextromethorphan Oral Liquid 10 milliLiter(s) Oral every 4 hours PRN Cough    Pertinent Labs: CBC Full  -  ( 31 Jan 2022 07:09 )  WBC Count : 8.29 K/uL  RBC Count : 5.33 M/uL  Hemoglobin : 16.4 g/dL  Hematocrit : 49.5 %  Platelet Count - Automated : Clumped K/uL  Mean Cell Volume : 92.9 fl  Mean Cell Hemoglobin : 30.8 pg  Mean Cell Hemoglobin Concentration : 33.1 gm/dL  Auto Neutrophil # : 6.83 K/uL  Auto Lymphocyte # : 0.54 K/uL  Auto Monocyte # : 0.85 K/uL  Auto Eosinophil # : 0.00 K/uL  Auto Basophil # : 0.02 K/uL  Auto Neutrophil % : 82.4 %  Auto Lymphocyte % : 6.5 %  Auto Monocyte % : 10.3 %  Auto Eosinophil % : 0.0 %  Auto Basophil % : 0.2 %    02-01 Na142 mmol/L Glu 137 mg/dL<H> K+ 3.8 mmol/L Cr  0.46 mg/dL<L> BUN 25.9 mg/dL<H> Phos n/a   Alb 2.5 g/dL<L> PAB n/a             Skin:     Nutrition focused physical exam conducted - found signs of malnutrition [ ]absent [x ]present    Subcutaneous fat loss: [x ] Orbital fat pads region, [x ]Buccal fat region, [ ]Triceps region,  [ ]Ribs region    Muscle wasting: [x ]Temples region, [ ]Clavicle region, [ ]Shoulder region, [ ]Scapula region, [ ]Interosseous region,  [ ]thigh region, [ ]Calf region    Estimated Needs:   [x ] no change since previous assessment  [ ] recalculated:     Current Nutrition Diagnosis: Malnutrition Severe acute (likely chronic) related to inability to meet sufficient protein-energy in setting of COVID+, s/p wrist surgery as evidenced by meeting <50% of est energy needs for >5days, sev fat/muscle dep,  2+ L hand edema. Plan is LINDSEY when medically stable.       Recommendations: Provide high protein gelatein BID and ensure pudding TID   continue MVI    Monitoring and Evaluation:   [x ] PO intake [x ] Tolerance to diet prescription [X] Weights  [X] Follow up per protocol [X] Labs:

## 2022-02-01 NOTE — PROGRESS NOTE ADULT - ASSESSMENT
89 years old female with history of Chronic A. Fib (no longer on Xarelto due to fall risk) and Hypothyroidism presented with shortness of breath. + COVID exposure.     1) Acute Respiratory Failure with Hypoxia  - secondary to COVID-19 Pneumonia  - Continue Remdesivir (extended course)   - Dexamethasone 6 mg IVP daily   - Tylenol, Albuterol and Robitussin PRN  - Continue supplemental oxygen via NC, wean off as tolerated, weaned down to 3L     - Encourage for prone position every 2-3 hours for 30 minutes if able to cooperate   - Incentive spirometry  - ID follow up noted: no longer a candidate for Actemra due to duration of symptoms     2) Chronic A. Fib  - Continue Cardizem  - Not on AC due to fall risk    3) Prediabetes   - HbA1c 6.3  - Accu checks and ISS    4) Hypothyroidism  - Continue Synthroid    5) Dysphagia  - ST following, recommended -- Pureed only. No liquids.   - May need MBS  - Will order CT Head to rule out stroke as patient has a history of A. Fib and is not on any AC    6) Hypernatremia  - Likely due to dehydration  - Improved with IVF    7) Severe Protein Calorie Malnutrition  - Nutrition Eval noted  - Added MVI  - Continue Ensure Pudding   - Add Protein Gelatin     DVT Prophylaxis -- Lovenox SQ    GOC: Full Code. Prognosis guarded.     Dispo: LINDSEY once stable.

## 2022-02-01 NOTE — GOALS OF CARE CONVERSATION - ADVANCED CARE PLANNING - CONVERSATION DETAILS
Advance care directives discussed with patient's son Kobi who is the health care proxy, he said patient one time mentioned she will not want any heroic measure; but he said the decision was not finalized. Son is requesting time to discuss with family and patient. Pending their decision, patient is full code ans requested by son.
Discussed advance directives with patient's son - Kobi Schneider who is Health Care Proxy. As per him, patient had expressed her wishes that she did not want any heroic measures in case of emergency. He wants to honor her wishes.

## 2022-02-02 LAB
ALBUMIN SERPL ELPH-MCNC: 2.4 G/DL — LOW (ref 3.3–5.2)
ALP SERPL-CCNC: 70 U/L — SIGNIFICANT CHANGE UP (ref 40–120)
ALT FLD-CCNC: 15 U/L — SIGNIFICANT CHANGE UP
ANION GAP SERPL CALC-SCNC: 12 MMOL/L — SIGNIFICANT CHANGE UP (ref 5–17)
AST SERPL-CCNC: 17 U/L — SIGNIFICANT CHANGE UP
BASOPHILS # BLD AUTO: 0.02 K/UL — SIGNIFICANT CHANGE UP (ref 0–0.2)
BASOPHILS NFR BLD AUTO: 0.2 % — SIGNIFICANT CHANGE UP (ref 0–2)
BILIRUB SERPL-MCNC: 0.6 MG/DL — SIGNIFICANT CHANGE UP (ref 0.4–2)
BUN SERPL-MCNC: 27.1 MG/DL — HIGH (ref 8–20)
CALCIUM SERPL-MCNC: 8 MG/DL — LOW (ref 8.6–10.2)
CHLORIDE SERPL-SCNC: 108 MMOL/L — HIGH (ref 98–107)
CO2 SERPL-SCNC: 24 MMOL/L — SIGNIFICANT CHANGE UP (ref 22–29)
CREAT SERPL-MCNC: 0.51 MG/DL — SIGNIFICANT CHANGE UP (ref 0.5–1.3)
CRP SERPL-MCNC: 4 MG/L — SIGNIFICANT CHANGE UP
EOSINOPHIL # BLD AUTO: 0 K/UL — SIGNIFICANT CHANGE UP (ref 0–0.5)
EOSINOPHIL NFR BLD AUTO: 0 % — SIGNIFICANT CHANGE UP (ref 0–6)
FERRITIN SERPL-MCNC: 344 NG/ML — HIGH (ref 15–150)
GLUCOSE BLDC GLUCOMTR-MCNC: 128 MG/DL — HIGH (ref 70–99)
GLUCOSE BLDC GLUCOMTR-MCNC: 142 MG/DL — HIGH (ref 70–99)
GLUCOSE BLDC GLUCOMTR-MCNC: 179 MG/DL — HIGH (ref 70–99)
GLUCOSE SERPL-MCNC: 128 MG/DL — HIGH (ref 70–99)
HCT VFR BLD CALC: 45 % — SIGNIFICANT CHANGE UP (ref 34.5–45)
HGB BLD-MCNC: 15.2 G/DL — SIGNIFICANT CHANGE UP (ref 11.5–15.5)
IMM GRANULOCYTES NFR BLD AUTO: 1 % — SIGNIFICANT CHANGE UP (ref 0–1.5)
LYMPHOCYTES # BLD AUTO: 0.48 K/UL — LOW (ref 1–3.3)
LYMPHOCYTES # BLD AUTO: 4.7 % — LOW (ref 13–44)
MCHC RBC-ENTMCNC: 30.5 PG — SIGNIFICANT CHANGE UP (ref 27–34)
MCHC RBC-ENTMCNC: 33.8 GM/DL — SIGNIFICANT CHANGE UP (ref 32–36)
MCV RBC AUTO: 90.4 FL — SIGNIFICANT CHANGE UP (ref 80–100)
MONOCYTES # BLD AUTO: 0.97 K/UL — HIGH (ref 0–0.9)
MONOCYTES NFR BLD AUTO: 9.5 % — SIGNIFICANT CHANGE UP (ref 2–14)
NEUTROPHILS # BLD AUTO: 8.69 K/UL — HIGH (ref 1.8–7.4)
NEUTROPHILS NFR BLD AUTO: 84.6 % — HIGH (ref 43–77)
PLATELET # BLD AUTO: SIGNIFICANT CHANGE UP K/UL (ref 150–400)
POTASSIUM SERPL-MCNC: 3.6 MMOL/L — SIGNIFICANT CHANGE UP (ref 3.5–5.3)
POTASSIUM SERPL-SCNC: 3.6 MMOL/L — SIGNIFICANT CHANGE UP (ref 3.5–5.3)
PROT SERPL-MCNC: 4.9 G/DL — LOW (ref 6.6–8.7)
RBC # BLD: 4.98 M/UL — SIGNIFICANT CHANGE UP (ref 3.8–5.2)
RBC # FLD: 12.8 % — SIGNIFICANT CHANGE UP (ref 10.3–14.5)
SODIUM SERPL-SCNC: 144 MMOL/L — SIGNIFICANT CHANGE UP (ref 135–145)
WBC # BLD: 10.26 K/UL — SIGNIFICANT CHANGE UP (ref 3.8–10.5)
WBC # FLD AUTO: 10.26 K/UL — SIGNIFICANT CHANGE UP (ref 3.8–10.5)

## 2022-02-02 PROCEDURE — 99232 SBSQ HOSP IP/OBS MODERATE 35: CPT

## 2022-02-02 RX ORDER — DILTIAZEM HCL 120 MG
60 CAPSULE, EXT RELEASE 24 HR ORAL EVERY 8 HOURS
Refills: 0 | Status: DISCONTINUED | OUTPATIENT
Start: 2022-02-02 | End: 2022-02-05

## 2022-02-02 RX ORDER — HALOPERIDOL DECANOATE 100 MG/ML
2 INJECTION INTRAMUSCULAR EVERY 6 HOURS
Refills: 0 | Status: DISCONTINUED | OUTPATIENT
Start: 2022-02-02 | End: 2022-02-05

## 2022-02-02 RX ORDER — QUETIAPINE FUMARATE 200 MG/1
25 TABLET, FILM COATED ORAL AT BEDTIME
Refills: 0 | Status: DISCONTINUED | OUTPATIENT
Start: 2022-02-02 | End: 2022-02-04

## 2022-02-02 RX ADMIN — Medication 60 MILLIGRAM(S): at 06:04

## 2022-02-02 RX ADMIN — ENOXAPARIN SODIUM 40 MILLIGRAM(S): 100 INJECTION SUBCUTANEOUS at 16:29

## 2022-02-02 RX ADMIN — Medication 50 MICROGRAM(S): at 06:04

## 2022-02-02 RX ADMIN — Medication 6 MILLIGRAM(S): at 06:05

## 2022-02-02 RX ADMIN — SODIUM CHLORIDE 75 MILLILITER(S): 9 INJECTION, SOLUTION INTRAVENOUS at 16:30

## 2022-02-02 RX ADMIN — PANTOPRAZOLE SODIUM 40 MILLIGRAM(S): 20 TABLET, DELAYED RELEASE ORAL at 16:30

## 2022-02-02 RX ADMIN — Medication 60 MILLIGRAM(S): at 23:04

## 2022-02-02 RX ADMIN — TAMSULOSIN HYDROCHLORIDE 0.4 MILLIGRAM(S): 0.4 CAPSULE ORAL at 23:03

## 2022-02-02 RX ADMIN — SODIUM CHLORIDE 75 MILLILITER(S): 9 INJECTION, SOLUTION INTRAVENOUS at 23:05

## 2022-02-02 RX ADMIN — QUETIAPINE FUMARATE 25 MILLIGRAM(S): 200 TABLET, FILM COATED ORAL at 23:03

## 2022-02-02 NOTE — PROGRESS NOTE ADULT - ASSESSMENT
89 years old female with history of Chronic A. Fib (no longer on Xarelto due to fall risk) and Hypothyroidism presented with shortness of breath. + COVID exposure.     1) Acute Respiratory Failure with Hypoxia  - secondary to COVID-19 Pneumonia  - Finished Remdesivir (extended course) on 2/1/22  - Finished Dexamethasone 6 mg IVP daily   - Tylenol, Albuterol and Robitussin PRN  - Continue supplemental oxygen via NC, wean off as tolerated, requiring 2-4L    - Not cooperating for prone position   - Incentive spirometry  - ID follow up noted    2) Chronic A. Fib  - Continue Cardizem  - Not on AC due to fall risk    3) Prediabetes   - HbA1c 6.3  - Accu checks and ISS    4) Hypothyroidism  - Continue Synthroid    5) Dysphagia  - No acute findings on CT Head   - ST following, recommended -- Pureed only. No liquids.   - May need MBS    6) Hypernatremia  - Likely due to dehydration  - Improved with IVF    7) Severe Protein Calorie Malnutrition  - Nutrition Eval noted  - Added MVI  - Continue Ensure Pudding   - Add Protein Gelatin     DVT Prophylaxis -- Lovenox SQ    GOC: DNR/I. Prognosis guarded.     Dispo: [facility] once tolerating liquids.

## 2022-02-03 ENCOUNTER — TRANSCRIPTION ENCOUNTER (OUTPATIENT)
Age: 87
End: 2022-02-03

## 2022-02-03 LAB
GLUCOSE BLDC GLUCOMTR-MCNC: 129 MG/DL — HIGH (ref 70–99)
GLUCOSE BLDC GLUCOMTR-MCNC: 134 MG/DL — HIGH (ref 70–99)
GLUCOSE BLDC GLUCOMTR-MCNC: 146 MG/DL — HIGH (ref 70–99)
GLUCOSE BLDC GLUCOMTR-MCNC: 217 MG/DL — HIGH (ref 70–99)
SARS-COV-2 RNA SPEC QL NAA+PROBE: DETECTED

## 2022-02-03 PROCEDURE — 99232 SBSQ HOSP IP/OBS MODERATE 35: CPT

## 2022-02-03 RX ORDER — DILTIAZEM HCL 120 MG
1 CAPSULE, EXT RELEASE 24 HR ORAL
Qty: 0 | Refills: 0 | DISCHARGE
Start: 2022-02-03

## 2022-02-03 RX ORDER — RIVAROXABAN 15 MG-20MG
1 KIT ORAL
Qty: 30 | Refills: 0
Start: 2022-02-03 | End: 2022-03-04

## 2022-02-03 RX ORDER — TAMSULOSIN HYDROCHLORIDE 0.4 MG/1
1 CAPSULE ORAL
Qty: 0 | Refills: 0 | DISCHARGE
Start: 2022-02-03

## 2022-02-03 RX ORDER — ALBUTEROL 90 UG/1
2 AEROSOL, METERED ORAL
Qty: 0 | Refills: 0 | DISCHARGE
Start: 2022-02-03

## 2022-02-03 RX ORDER — QUETIAPINE FUMARATE 200 MG/1
1 TABLET, FILM COATED ORAL
Qty: 0 | Refills: 0 | DISCHARGE
Start: 2022-02-03

## 2022-02-03 RX ORDER — METFORMIN HYDROCHLORIDE 850 MG/1
1 TABLET ORAL
Qty: 30 | Refills: 0
Start: 2022-02-03 | End: 2022-03-04

## 2022-02-03 RX ORDER — GUAIFENESIN/DEXTROMETHORPHAN 600MG-30MG
10 TABLET, EXTENDED RELEASE 12 HR ORAL
Qty: 0 | Refills: 0 | DISCHARGE
Start: 2022-02-03

## 2022-02-03 RX ORDER — ACETAMINOPHEN 500 MG
2 TABLET ORAL
Qty: 0 | Refills: 0 | DISCHARGE
Start: 2022-02-03

## 2022-02-03 RX ADMIN — ENOXAPARIN SODIUM 40 MILLIGRAM(S): 100 INJECTION SUBCUTANEOUS at 12:55

## 2022-02-03 RX ADMIN — Medication 50 MICROGRAM(S): at 06:19

## 2022-02-03 RX ADMIN — TAMSULOSIN HYDROCHLORIDE 0.4 MILLIGRAM(S): 0.4 CAPSULE ORAL at 22:26

## 2022-02-03 RX ADMIN — SODIUM CHLORIDE 75 MILLILITER(S): 9 INJECTION, SOLUTION INTRAVENOUS at 06:21

## 2022-02-03 RX ADMIN — SODIUM CHLORIDE 75 MILLILITER(S): 9 INJECTION, SOLUTION INTRAVENOUS at 18:00

## 2022-02-03 RX ADMIN — Medication 2: at 11:48

## 2022-02-03 RX ADMIN — QUETIAPINE FUMARATE 25 MILLIGRAM(S): 200 TABLET, FILM COATED ORAL at 22:26

## 2022-02-03 RX ADMIN — PANTOPRAZOLE SODIUM 40 MILLIGRAM(S): 20 TABLET, DELAYED RELEASE ORAL at 12:56

## 2022-02-03 RX ADMIN — Medication 60 MILLIGRAM(S): at 22:27

## 2022-02-03 RX ADMIN — HALOPERIDOL DECANOATE 2 MILLIGRAM(S): 100 INJECTION INTRAMUSCULAR at 00:24

## 2022-02-03 RX ADMIN — Medication 60 MILLIGRAM(S): at 06:19

## 2022-02-03 NOTE — DISCHARGE NOTE PROVIDER - HOSPITAL COURSE
89 years old female with history of Chronic A. Fib (no longer on Xarelto due to fall risk) and Hypothyroidism presented with shortness of breath. + COVID exposure. Admitted with Acute Respiratory Failure with Hypoxia secondary to COVID-19 Pneumonia. She was placed on supplemental oxygen and was started on Remdesivir and Dexamethasone. Her oxygen requirement went up to HFNC, which was slowly weaned down. She is currently on 2-4LNC. She has finished extended course of Remdesivir. During hospitalization, she was having difficulty swallowing, speech therapy followed her. She is tolerating easy to chew solids and mildly thick liquids. Her symptoms have improved and she is stable for discharge.

## 2022-02-03 NOTE — DISCHARGE NOTE PROVIDER - NSDCCPCAREPLAN_GEN_ALL_CORE_FT
No PRINCIPAL DISCHARGE DIAGNOSIS  Diagnosis: Acute respiratory failure with hypoxia  Assessment and Plan of Treatment: secondary to COVID-19 Pneumonia.  Finished treatment.  Continue supplemental oxygen.  Incentive spirometry.  Follow up with PMD in 1 week.

## 2022-02-03 NOTE — DISCHARGE NOTE PROVIDER - DISCHARGE DIET
DASH Diet/Consistent Carbohydrate Diabetic Diets/Nutrition Supplements/Easy to Chew Diet/Mildly Thick Liquids

## 2022-02-03 NOTE — PROGRESS NOTE ADULT - ASSESSMENT
89 years old female with history of Chronic A. Fib (no longer on Xarelto due to fall risk) and Hypothyroidism presented with shortness of breath. + COVID exposure.     1) Acute Respiratory Failure with Hypoxia  - secondary to COVID-19 Pneumonia  - Finished Remdesivir (extended course) on 2/1/22  - Finished Dexamethasone 6 mg IVP daily   - Tylenol, Albuterol and Robitussin PRN  - Continue supplemental oxygen via NC, wean off as tolerated, requiring 2-4L    - Not cooperating for prone position   - Incentive spirometry  - ID follow up noted    2) Chronic A. Fib  - Continue Cardizem  - Not on AC due to fall risk    3) Prediabetes   - HbA1c 6.3  - Accu checks and ISS    4) Hypothyroidism  - Continue Synthroid    5) Dysphagia  - No acute findings on CT Head   - ST following, recommended -- Pureed only. No liquids.   - Family is coming today for speech therapy evaluation for liquids.     6) Hypernatremia  - Likely due to dehydration  - Improved with IVF    7) Severe Protein Calorie Malnutrition  - Nutrition Eval noted  - Added MVI  - Continue Ensure Pudding and Protein Gelatin     DVT Prophylaxis -- Lovenox SQ    GOC: DNR/I. Prognosis guarded.     Dispo: [facility] once tolerating liquids.

## 2022-02-03 NOTE — DISCHARGE NOTE PROVIDER - DETAILS OF MALNUTRITION DIAGNOSIS/DIAGNOSES
This patient has been assessed with a concern for Malnutrition and was treated during this hospitalization for the following Nutrition diagnosis/diagnoses:     -  01/28/2022: Severe protein-calorie malnutrition   -  01/28/2022: Underweight (BMI < 19)

## 2022-02-03 NOTE — DISCHARGE NOTE PROVIDER - NSDCMRMEDTOKEN_GEN_ALL_CORE_FT
acetaminophen 325 mg oral tablet: 2 tab(s) orally every 4 hours, As needed, Temp greater or equal to 38.5C (101.3F)  albuterol 90 mcg/inh inhalation aerosol: 2 puff(s) inhaled every 4 hours, As needed, Shortness of Breath and/or Wheezing  dilTIAZem 60 mg oral tablet: 1 tab(s) orally every 8 hours  guaifenesin-dextromethorphan 100 mg-10 mg/5 mL oral liquid: 10 milliliter(s) orally every 4 hours, As needed, Cough  metFORMIN 500 mg oral tablet, extended release: 1 tab(s) orally once a day   Multiple Vitamins oral tablet: 1 tab(s) orally once a day  QUEtiapine 25 mg oral tablet: 1 tab(s) orally once a day (at bedtime)  Synthroid 50 mcg (0.05 mg) oral tablet: 1 tab(s) orally once a day  tamsulosin 0.4 mg oral capsule: 1 cap(s) orally once a day (at bedtime)  Xarelto 10 mg oral tablet: 1 tab(s) orally once a day    acetaminophen 325 mg oral tablet: 2 tab(s) orally every 4 hours, As needed, Temp greater or equal to 38.5C (101.3F)  albuterol 90 mcg/inh inhalation aerosol: 2 puff(s) inhaled every 4 hours, As needed, Shortness of Breath and/or Wheezing  dilTIAZem 60 mg oral tablet: 1 tab(s) orally every 8 hours  guaifenesin-dextromethorphan 100 mg-10 mg/5 mL oral liquid: 10 milliliter(s) orally every 4 hours, As needed, Cough  metFORMIN 500 mg oral tablet, extended release: 1 tab(s) orally once a day   Multiple Vitamins oral tablet: 1 tab(s) orally once a day  QUEtiapine 25 mg oral tablet: 0.5 tab(s) orally once a day (at bedtime)  Synthroid 50 mcg (0.05 mg) oral tablet: 1 tab(s) orally once a day  tamsulosin 0.4 mg oral capsule: 1 cap(s) orally once a day (at bedtime)  Xarelto 10 mg oral tablet: 1 tab(s) orally once a day   Xarelto 10 mg oral tablet: 1 tab(s) orally once a day x 1 month

## 2022-02-03 NOTE — DISCHARGE NOTE PROVIDER - CARE PROVIDER_API CALL
Calvin Ellison)  Internal Medicine  [address] [place] [address]  Phone: [phone]  Fax: [phone]  Established Patient  Follow Up Time: 1 week

## 2022-02-04 LAB
GLUCOSE BLDC GLUCOMTR-MCNC: 117 MG/DL — HIGH (ref 70–99)
GLUCOSE BLDC GLUCOMTR-MCNC: 76 MG/DL — SIGNIFICANT CHANGE UP (ref 70–99)
GLUCOSE BLDC GLUCOMTR-MCNC: 77 MG/DL — SIGNIFICANT CHANGE UP (ref 70–99)
GLUCOSE BLDC GLUCOMTR-MCNC: 82 MG/DL — SIGNIFICANT CHANGE UP (ref 70–99)
GLUCOSE BLDC GLUCOMTR-MCNC: 90 MG/DL — SIGNIFICANT CHANGE UP (ref 70–99)
HCT VFR BLD CALC: 42.1 % — SIGNIFICANT CHANGE UP (ref 34.5–45)
HGB BLD-MCNC: 14 G/DL — SIGNIFICANT CHANGE UP (ref 11.5–15.5)
MCHC RBC-ENTMCNC: 30.3 PG — SIGNIFICANT CHANGE UP (ref 27–34)
MCHC RBC-ENTMCNC: 33.3 GM/DL — SIGNIFICANT CHANGE UP (ref 32–36)
MCV RBC AUTO: 91.1 FL — SIGNIFICANT CHANGE UP (ref 80–100)
PLATELET # BLD AUTO: 141 K/UL — LOW (ref 150–400)
RBC # BLD: 4.62 M/UL — SIGNIFICANT CHANGE UP (ref 3.8–5.2)
RBC # FLD: 13.2 % — SIGNIFICANT CHANGE UP (ref 10.3–14.5)
WBC # BLD: 9.71 K/UL — SIGNIFICANT CHANGE UP (ref 3.8–10.5)
WBC # FLD AUTO: 9.71 K/UL — SIGNIFICANT CHANGE UP (ref 3.8–10.5)

## 2022-02-04 PROCEDURE — 99232 SBSQ HOSP IP/OBS MODERATE 35: CPT

## 2022-02-04 RX ORDER — QUETIAPINE FUMARATE 200 MG/1
12.5 TABLET, FILM COATED ORAL AT BEDTIME
Refills: 0 | Status: DISCONTINUED | OUTPATIENT
Start: 2022-02-04 | End: 2022-02-05

## 2022-02-04 RX ADMIN — Medication 1 TABLET(S): at 13:38

## 2022-02-04 RX ADMIN — Medication 60 MILLIGRAM(S): at 06:10

## 2022-02-04 RX ADMIN — Medication 60 MILLIGRAM(S): at 22:22

## 2022-02-04 RX ADMIN — SODIUM CHLORIDE 75 MILLILITER(S): 9 INJECTION, SOLUTION INTRAVENOUS at 05:53

## 2022-02-04 RX ADMIN — ENOXAPARIN SODIUM 40 MILLIGRAM(S): 100 INJECTION SUBCUTANEOUS at 13:38

## 2022-02-04 RX ADMIN — Medication 50 MICROGRAM(S): at 06:10

## 2022-02-04 RX ADMIN — DIPHENHYDRAMINE HYDROCHLORIDE AND LIDOCAINE HYDROCHLORIDE AND ALUMINUM HYDROXIDE AND MAGNESIUM HYDRO 5 MILLILITER(S): KIT at 16:43

## 2022-02-04 RX ADMIN — Medication 60 MILLIGRAM(S): at 13:38

## 2022-02-04 RX ADMIN — DIPHENHYDRAMINE HYDROCHLORIDE AND LIDOCAINE HYDROCHLORIDE AND ALUMINUM HYDROXIDE AND MAGNESIUM HYDRO 5 MILLILITER(S): KIT at 06:10

## 2022-02-04 RX ADMIN — QUETIAPINE FUMARATE 12.5 MILLIGRAM(S): 200 TABLET, FILM COATED ORAL at 22:21

## 2022-02-04 RX ADMIN — TAMSULOSIN HYDROCHLORIDE 0.4 MILLIGRAM(S): 0.4 CAPSULE ORAL at 22:22

## 2022-02-04 NOTE — PROGRESS NOTE ADULT - ASSESSMENT
89 years old female with history of Chronic A. Fib (no longer on Xarelto due to fall risk) and Hypothyroidism presented with shortness of breath. + COVID exposure.     1) Acute Respiratory Failure with Hypoxia  - secondary to COVID-19 Pneumonia  - Finished Remdesivir (extended course) on 2/1/22  - Finished Dexamethasone 6 mg IVP daily   - Tylenol, Albuterol and Robitussin PRN  - Continue supplemental oxygen via NC, wean off as tolerated, requiring 2-4L    - Not cooperating for prone position   - Incentive spirometry  - ID follow up noted    2) Chronic A. Fib  - Continue Cardizem  - Not on AC due to fall risk    3) Prediabetes   - HbA1c 6.3  - Accu checks and ISS    4) Hypothyroidism  - Continue Synthroid    5) Dysphagia  - No acute findings on CT Head   - ST following, recommended -- Easy to chew solids, mildly thick fluids.    6) Hypernatremia  - Likely due to dehydration  - Improved with IVF    7) Severe Protein Calorie Malnutrition  - Nutrition Eval noted  - Added MVI, Glucerna and Protein Gelatin     DVT Prophylaxis -- Lovenox SQ    GOC: DNR/I. Prognosis guarded.     Dispo: [facility] once arranged.

## 2022-02-05 ENCOUNTER — TRANSCRIPTION ENCOUNTER (OUTPATIENT)
Age: 87
End: 2022-02-05

## 2022-02-05 VITALS
SYSTOLIC BLOOD PRESSURE: 132 MMHG | DIASTOLIC BLOOD PRESSURE: 63 MMHG | TEMPERATURE: 97 F | OXYGEN SATURATION: 95 % | HEART RATE: 71 BPM | RESPIRATION RATE: 19 BRPM

## 2022-02-05 LAB — GLUCOSE BLDC GLUCOMTR-MCNC: 104 MG/DL — HIGH (ref 70–99)

## 2022-02-05 PROCEDURE — 81001 URINALYSIS AUTO W/SCOPE: CPT

## 2022-02-05 PROCEDURE — 82728 ASSAY OF FERRITIN: CPT

## 2022-02-05 PROCEDURE — U0003: CPT

## 2022-02-05 PROCEDURE — 96374 THER/PROPH/DIAG INJ IV PUSH: CPT

## 2022-02-05 PROCEDURE — 93005 ELECTROCARDIOGRAM TRACING: CPT

## 2022-02-05 PROCEDURE — 85025 COMPLETE CBC W/AUTO DIFF WBC: CPT

## 2022-02-05 PROCEDURE — 86140 C-REACTIVE PROTEIN: CPT

## 2022-02-05 PROCEDURE — 99285 EMERGENCY DEPT VISIT HI MDM: CPT

## 2022-02-05 PROCEDURE — 99239 HOSP IP/OBS DSCHRG MGMT >30: CPT

## 2022-02-05 PROCEDURE — 70450 CT HEAD/BRAIN W/O DYE: CPT

## 2022-02-05 PROCEDURE — 36415 COLL VENOUS BLD VENIPUNCTURE: CPT

## 2022-02-05 PROCEDURE — 82962 GLUCOSE BLOOD TEST: CPT

## 2022-02-05 PROCEDURE — 83036 HEMOGLOBIN GLYCOSYLATED A1C: CPT

## 2022-02-05 PROCEDURE — 85610 PROTHROMBIN TIME: CPT

## 2022-02-05 PROCEDURE — 97530 THERAPEUTIC ACTIVITIES: CPT

## 2022-02-05 PROCEDURE — 93931 UPPER EXTREMITY STUDY: CPT

## 2022-02-05 PROCEDURE — 84484 ASSAY OF TROPONIN QUANT: CPT

## 2022-02-05 PROCEDURE — 85379 FIBRIN DEGRADATION QUANT: CPT

## 2022-02-05 PROCEDURE — G1004: CPT

## 2022-02-05 PROCEDURE — 97110 THERAPEUTIC EXERCISES: CPT

## 2022-02-05 PROCEDURE — 84145 PROCALCITONIN (PCT): CPT

## 2022-02-05 PROCEDURE — 92526 ORAL FUNCTION THERAPY: CPT

## 2022-02-05 PROCEDURE — 71275 CT ANGIOGRAPHY CHEST: CPT | Mod: ME

## 2022-02-05 PROCEDURE — 93971 EXTREMITY STUDY: CPT

## 2022-02-05 PROCEDURE — 0225U NFCT DS DNA&RNA 21 SARSCOV2: CPT

## 2022-02-05 PROCEDURE — 83880 ASSAY OF NATRIURETIC PEPTIDE: CPT

## 2022-02-05 PROCEDURE — 85027 COMPLETE CBC AUTOMATED: CPT

## 2022-02-05 PROCEDURE — 80048 BASIC METABOLIC PNL TOTAL CA: CPT

## 2022-02-05 PROCEDURE — 83735 ASSAY OF MAGNESIUM: CPT

## 2022-02-05 PROCEDURE — 85730 THROMBOPLASTIN TIME PARTIAL: CPT

## 2022-02-05 PROCEDURE — U0005: CPT

## 2022-02-05 PROCEDURE — 71045 X-RAY EXAM CHEST 1 VIEW: CPT

## 2022-02-05 PROCEDURE — 87040 BLOOD CULTURE FOR BACTERIA: CPT

## 2022-02-05 PROCEDURE — 94760 N-INVAS EAR/PLS OXIMETRY 1: CPT

## 2022-02-05 PROCEDURE — 80053 COMPREHEN METABOLIC PANEL: CPT

## 2022-02-05 RX ORDER — QUETIAPINE FUMARATE 200 MG/1
0.5 TABLET, FILM COATED ORAL
Qty: 0 | Refills: 0 | DISCHARGE

## 2022-02-05 RX ORDER — RIVAROXABAN 15 MG-20MG
1 KIT ORAL
Qty: 30 | Refills: 0
Start: 2022-02-05 | End: 2022-03-06

## 2022-02-05 RX ADMIN — DIPHENHYDRAMINE HYDROCHLORIDE AND LIDOCAINE HYDROCHLORIDE AND ALUMINUM HYDROXIDE AND MAGNESIUM HYDRO 5 MILLILITER(S): KIT at 06:15

## 2022-02-05 RX ADMIN — Medication 50 MICROGRAM(S): at 06:15

## 2022-02-05 RX ADMIN — Medication 60 MILLIGRAM(S): at 06:15

## 2022-02-05 NOTE — PROGRESS NOTE ADULT - NUTRITIONAL ASSESSMENT
This patient has been assessed with a concern for Malnutrition and has been determined to have a diagnosis/diagnoses of Severe protein-calorie malnutrition and Underweight (BMI < 19).    This patient is being managed with:   Diet Easy to Chew-  Consistent Carbohydrate {No Snacks} (CSTCHO)  DASH/TLC {Sodium & Cholesterol Restricted} (DASH)  Mildly Thick Liquids (MILDTHICKLIQS)  Supplement Feeding Modality:  Oral  Ensure Pudding Cans or Servings Per Day:  1       Frequency:  Three Times a day  Entered: Feb  3 2022  1:25PM    
This patient has been assessed with a concern for Malnutrition and has been determined to have a diagnosis/diagnoses of Severe protein-calorie malnutrition and Underweight (BMI < 19).    This patient is being managed with:   Diet Pureed-  Consistent Carbohydrate {No Snacks} (CSTCHO)  DASH/TLC {Sodium & Cholesterol Restricted} (DASH)  No Liquids (NOLIQUIDS)  Prosource Gelatein 20 Sugar Free     Qty per Day:  2  Supplement Feeding Modality:  Oral  Ensure Pudding Cans or Servings Per Day:  1       Frequency:  Three Times a day  Entered: Feb 1 2022 12:04PM    
This patient has been assessed with a concern for Malnutrition and has been determined to have a diagnosis/diagnoses of Severe protein-calorie malnutrition and Underweight (BMI < 19).    This patient is being managed with:   Diet Pureed-  Consistent Carbohydrate {No Snacks} (CSTCHO)  DASH/TLC {Sodium & Cholesterol Restricted} (DASH)  No Liquids (NOLIQUIDS)  Supplement Feeding Modality:  Oral  Ensure Pudding Cans or Servings Per Day:  1       Frequency:  Three Times a day  Entered: Jan 27 2022  1:20PM    
This patient has been assessed with a concern for Malnutrition and has been determined to have a diagnosis/diagnoses of Severe protein-calorie malnutrition and Underweight (BMI < 19).    This patient is being managed with:   Diet Pureed-  Consistent Carbohydrate {No Snacks} (CSTCHO)  DASH/TLC {Sodium & Cholesterol Restricted} (DASH)  No Liquids (NOLIQUIDS)  Supplement Feeding Modality:  Oral  Ensure Pudding Cans or Servings Per Day:  1       Frequency:  Three Times a day  Entered: Jan 27 2022  1:20PM    
This patient has been assessed with a concern for Malnutrition and has been determined to have a diagnosis/diagnoses of Severe protein-calorie malnutrition and Underweight (BMI < 19).    This patient is being managed with:   Diet Easy to Chew-  Consistent Carbohydrate {No Snacks} (CSTCHO)  DASH/TLC {Sodium & Cholesterol Restricted} (DASH)  Mildly Thick Liquids (MILDTHICKLIQS)  Prosource Gelatein Plus     Qty per Day:  2  Supplement Feeding Modality:  Oral  Glucerna Shake Cans or Servings Per Day:  1       Frequency:  Three Times a day  Entered: Feb 4 2022 10:17AM    
This patient has been assessed with a concern for Malnutrition and has been determined to have a diagnosis/diagnoses of Severe protein-calorie malnutrition and Underweight (BMI < 19).    This patient is being managed with:   Diet Pureed-  Consistent Carbohydrate {No Snacks} (CSTCHO)  DASH/TLC {Sodium & Cholesterol Restricted} (DASH)  No Liquids (NOLIQUIDS)  Prosource Gelatein 20 Sugar Free     Qty per Day:  2  Supplement Feeding Modality:  Oral  Ensure Pudding Cans or Servings Per Day:  1       Frequency:  Three Times a day  Entered: Feb 1 2022 12:04PM    
This patient has been assessed with a concern for Malnutrition and has been determined to have a diagnosis/diagnoses of Severe protein-calorie malnutrition and Underweight (BMI < 19).    This patient is being managed with:   Diet Pureed-  Consistent Carbohydrate {No Snacks} (CSTCHO)  DASH/TLC {Sodium & Cholesterol Restricted} (DASH)  No Liquids (NOLIQUIDS)  Prosource Gelatein 20 Sugar Free     Qty per Day:  2  Supplement Feeding Modality:  Oral  Ensure Pudding Cans or Servings Per Day:  1       Frequency:  Three Times a day  Entered: Feb 1 2022 12:04PM    
This patient has been assessed with a concern for Malnutrition and has been determined to have a diagnosis/diagnoses of Severe protein-calorie malnutrition and Underweight (BMI < 19).    This patient is being managed with:   Diet Pureed-  Consistent Carbohydrate {No Snacks} (CSTCHO)  DASH/TLC {Sodium & Cholesterol Restricted} (DASH)  No Liquids (NOLIQUIDS)  Supplement Feeding Modality:  Oral  Ensure Pudding Cans or Servings Per Day:  1       Frequency:  Three Times a day  Entered: Jan 27 2022  1:20PM

## 2022-02-05 NOTE — PROGRESS NOTE ADULT - PROVIDER SPECIALTY LIST ADULT
Hospitalist
Infectious Disease
Hospitalist
Hospitalist
Infectious Disease
Hospitalist

## 2022-02-05 NOTE — PROGRESS NOTE ADULT - SUBJECTIVE AND OBJECTIVE BOX
Pneumonia due to organism    HPI:  89 y/o female with PMH of AFIB (no longer on Xarelto due to risk of fall), hypothyroid came to the ED for shortness of breath. Patient is very hard of hearing, HPI obtained from son (Kobi via phone). As per son, patient has not been in her normal state (which is very agile, A/O x3, able to do ADLs) in the last 4 days. She has shortness of breath, fever, cough, HA; family have been trying to manage her but today, she was hypoxic to 83% on RA which prompted ED visit. Of note, son said his sister who the patient lives with had covid 2 weeks ago. Patient was not checked for covid, and she is not vaccinated against covid. No nausea, vomiting, abdominal pain, change in bowel/urinary habit, chest pain, fall reported. (23 Jan 2022 20:42)    Interval History:  Patient was seen and examined at bedside around 10:15 am.   Asking for yogurt.  Breathing is same.   Denies chest pain, palpitations, headache, dizziness, visual symptoms, nausea, vomiting or abdominal pain.    ROS:  As per interval history otherwise unremarkable.    PHYSICAL EXAM:  Vital Signs   T(C): 36.7 (27 Jan 2022 10:56), Max: 36.8 (26 Jan 2022 16:45)  T(F): 98 (27 Jan 2022 10:56), Max: 98.2 (26 Jan 2022 16:45)  HR: 66 (27 Jan 2022 10:56) (64 - 84)  BP: 124/82 (27 Jan 2022 10:56) (124/82 - 151/84)  RR: 22 (27 Jan 2022 10:56) (18 - 22)  SpO2: 98% (27 Jan 2022 10:56) (96% - 98%)  General: Elderly female sitting in bed comfortably. No acute distress  HEENT: EOMI. Clear conjunctivae. Moist mucus membrane  Neck: Supple.   Chest: Good air entry. No wheezing, rales or rhonchi.   Heart: Normal S1 & S2. RRR.   Abdomen: Soft. Non-tender. Non-distended. + BS  Ext: No pedal edema. No calf tenderness   Neuro: Awake and alert. No focal deficit. No speech disorder  Skin: Warm and Dry  Psychiatry: Normal mood and affect    I&O's Summary    26 Jan 2022 07:01  -  27 Jan 2022 07:00  --------------------------------------------------------  IN: 0 mL / OUT: 550 mL / NET: -550 mL    27 Jan 2022 07:01  -  27 Jan 2022 13:13  --------------------------------------------------------  IN: 0 mL / OUT: 350 mL / NET: -350 mL    LABS:                       13.2   6.26  )-----------( 397      ( 27 Jan 2022 10:27 )             41.7     01-27    146<H>  |  107  |  39.2<H>  ----------------------------<  192<H>  4.5   |  26.0  |  0.60    Ca    8.4<L>      27 Jan 2022 08:30    TPro  5.9<L>  /  Alb  2.8<L>  /  TBili  0.3<L>  /  DBili  x   /  AST  26  /  ALT  22  /  AlkPhos  67  01-27    RADIOLOGY & ADDITIONAL STUDIES:  Reviewed     MEDICATIONS  (STANDING):  dexAMETHasone  Injectable 6 milliGRAM(s) IV Push daily  dextrose 5% + sodium chloride 0.45%. 1000 milliLiter(s) (60 mL/Hr) IV Continuous <Continuous>  diltiazem    milliGRAM(s) Oral daily  enoxaparin Injectable 40 milliGRAM(s) SubCutaneous daily  levothyroxine 50 MICROGram(s) Oral daily  pantoprazole  Injectable 40 milliGRAM(s) IV Push daily  remdesivir  IVPB   IV Intermittent   remdesivir  IVPB 100 milliGRAM(s) IV Intermittent every 24 hours  tamsulosin 0.4 milliGRAM(s) Oral at bedtime    MEDICATIONS  (PRN):  acetaminophen     Tablet .. 650 milliGRAM(s) Oral every 4 hours PRN Temp greater or equal to 38.5C (101.3F)  ALBUTerol    90 MICROgram(s) HFA Inhaler 2 Puff(s) Inhalation every 4 hours PRN Shortness of Breath and/or Wheezing  benzonatate 100 milliGRAM(s) Oral three times a day PRN Cough  guaifenesin/dextromethorphan Oral Liquid 10 milliLiter(s) Oral every 4 hours PRN Cough      
Pneumonia due to organism    HPI:  89 y/o female with PMH of AFIB (no longer on Xarelto due to risk of fall), hypothyroid came to the ED for shortness of breath. Patient is very hard of hearing, HPI obtained from son (Kobi via phone). As per son, patient has not been in her normal state (which is very agile, A/O x3, able to do ADLs) in the last 4 days. She has shortness of breath, fever, cough, HA; family have been trying to manage her but today, she was hypoxic to 83% on RA which prompted ED visit. Of note, son said his sister who the patient lives with had covid 2 weeks ago. Patient was not checked for covid, and she is not vaccinated against covid. No nausea, vomiting, abdominal pain, change in bowel/urinary habit, chest pain, fall reported. (23 Jan 2022 20:42)    Interval History:  Patient was seen and examined at bedside around 9:15 am.   Asking for water.   Denies shortness of breath, chest pain, palpitations or cough.   Weaned down from HFNC to 5LNC yesterday.     ROS:  As per interval history otherwise unremarkable.    PHYSICAL EXAM:  Vital Signs   T(C): 36.3 (30 Jan 2022 10:59), Max: 36.3 (29 Jan 2022 18:28)  T(F): 97.4 (30 Jan 2022 10:59), Max: 97.4 (29 Jan 2022 18:28)  HR: 73 (30 Jan 2022 10:59) (59 - 73)  BP: 106/60 (30 Jan 2022 10:59) (106/60 - 158/77)  RR: 18 (30 Jan 2022 10:59) (18 - 20)  SpO2: 98% (30 Jan 2022 10:59) (96% - 98%)  General: Elderly female lying in bed comfortably. No acute distress. Emaciated.   HEENT: EOMI. Clear conjunctivae. Moist mucus membrane  Neck: Supple.   Chest: Good air entry. No wheezing, rales or rhonchi.   Heart: Normal S1 & S2. RRR.   Abdomen: Soft. Non-tender. Non-distended. + BS  Ext: No pedal edema. No calf tenderness   Neuro: Woke her up from sleep. Moves all four extremities. No speech disorder  Skin: Warm and Dry  Psychiatry: Anxious     LABS:  CAPILLARY BLOOD GLUCOSE  POCT Blood Glucose.: 131 mg/dL (30 Jan 2022 08:22)  POCT Blood Glucose.: 213 mg/dL (29 Jan 2022 21:09)  POCT Blood Glucose.: 237 mg/dL (29 Jan 2022 17:53)  POCT Blood Glucose.: 161 mg/dL (29 Jan 2022 12:03)                      14.1   6.74  )-----------( 160      ( 29 Jan 2022 09:10 )             44.3     01-30    142  |  106  |  24.1<H>  ----------------------------<  162<H>  3.5   |  25.0  |  0.46<L>    Ca    8.1<L>      30 Jan 2022 09:39  Mg     2.1     01-30    TPro  5.1<L>  /  Alb  2.5<L>  /  TBili  0.5  /  DBili  x   /  AST  14  /  ALT  16  /  AlkPhos  69  01-30    RADIOLOGY & ADDITIONAL STUDIES:  Reviewed     MEDICATIONS  (STANDING):  dexAMETHasone  Injectable 6 milliGRAM(s) IV Push daily  dextrose 40% Gel 15 Gram(s) Oral once  dextrose 5%. 1000 milliLiter(s) (50 mL/Hr) IV Continuous <Continuous>  dextrose 5%. 1000 milliLiter(s) (100 mL/Hr) IV Continuous <Continuous>  dextrose 50% Injectable 25 Gram(s) IV Push once  dextrose 50% Injectable 12.5 Gram(s) IV Push once  dextrose 50% Injectable 25 Gram(s) IV Push once  diltiazem    milliGRAM(s) Oral daily  enoxaparin Injectable 40 milliGRAM(s) SubCutaneous daily  FIRST- Mouthwash  BLM 5 milliLiter(s) Swish and Spit two times a day  glucagon  Injectable 1 milliGRAM(s) IntraMuscular once  insulin lispro (ADMELOG) corrective regimen sliding scale   SubCutaneous three times a day before meals  insulin lispro (ADMELOG) corrective regimen sliding scale   SubCutaneous at bedtime  lactated ringers. 1000 milliLiter(s) (100 mL/Hr) IV Continuous <Continuous>  levothyroxine 50 MICROGram(s) Oral daily  multivitamin 1 Tablet(s) Oral daily  pantoprazole  Injectable 40 milliGRAM(s) IV Push daily  remdesivir  IVPB 100 milliGRAM(s) IV Intermittent every 24 hours  tamsulosin 0.4 milliGRAM(s) Oral at bedtime    MEDICATIONS  (PRN):  acetaminophen     Tablet .. 650 milliGRAM(s) Oral every 4 hours PRN Temp greater or equal to 38.5C (101.3F)  ALBUTerol    90 MICROgram(s) HFA Inhaler 2 Puff(s) Inhalation every 4 hours PRN Shortness of Breath and/or Wheezing  benzonatate 100 milliGRAM(s) Oral three times a day PRN Cough  guaifenesin/dextromethorphan Oral Liquid 10 milliLiter(s) Oral every 4 hours PRN Cough            
Pneumonia due to organism    HPI:  89 y/o female with PMH of AFIB (no longer on Xarelto due to risk of fall), hypothyroid came to the ED for shortness of breath. Patient is very hard of hearing, HPI obtained from son (Kobi via phone). As per son, patient has not been in her normal state (which is very agile, A/O x3, able to do ADLs) in the last 4 days. She has shortness of breath, fever, cough, HA; family have been trying to manage her but today, she was hypoxic to 83% on RA which prompted ED visit. Of note, son said his sister who the patient lives with had covid 2 weeks ago. Patient was not checked for covid, and she is not vaccinated against covid. No nausea, vomiting, abdominal pain, change in bowel/urinary habit, chest pain, fall reported. (23 Jan 2022 20:42)    Interval History:  Patient was seen and examined at bedside around 8:30 am.   Asking for oatmeal.   Denies shortness of breath, chest pain, palpitations, headache, dizziness, visual symptoms, nausea, vomiting or abdominal pain.    ROS:  As per interval history otherwise unremarkable.    PHYSICAL EXAM:  Vital Signs   T(C): 36.8 (28 Jan 2022 09:35), Max: 36.8 (28 Jan 2022 09:35)  T(F): 98.3 (28 Jan 2022 09:35), Max: 98.3 (28 Jan 2022 09:35)  HR: 60 (28 Jan 2022 09:35) (59 - 68)  BP: 125/64 (28 Jan 2022 09:35) (125/64 - 147/62)  RR: 20 (28 Jan 2022 09:35) (18 - 22)  SpO2: 93% (28 Jan 2022 09:35) (91% - 100%)  General: Elderly female lying in bed comfortably. No acute distress. Emaciated.   HEENT: EOMI. Clear conjunctivae. Moist mucus membrane  Neck: Supple.   Chest: Good air entry. No wheezing, rales or rhonchi.   Heart: Normal S1 & S2. RRR.   Abdomen: Soft. Non-tender. Non-distended. + BS  Ext: No pedal edema. No calf tenderness   Neuro: Awake and alert. No focal deficit. No speech disorder  Skin: Warm and Dry  Psychiatry: Normal mood and affect    I&O's Summary    27 Jan 2022 07:01  -  28 Jan 2022 07:00  --------------------------------------------------------  IN: 900 mL / OUT: 450 mL / NET: 450 mL        LABS:                        13.2   6.26  )-----------( 397      ( 27 Jan 2022 10:27 )             41.7     01-28    143  |  107  |  26.4<H>  ----------------------------<  155<H>  3.8   |  28.0  |  0.45<L>    Ca    8.1<L>      28 Jan 2022 08:09    TPro  5.3<L>  /  Alb  2.7<L>  /  TBili  0.3<L>  /  DBili  x   /  AST  15  /  ALT  19  /  AlkPhos  64  01-28    Blood Culture: 01-23 @ 21:39  Organism --  Gram Stain Blood -- Gram Stain --  Specimen Source .Blood Blood  Culture-Blood --    RADIOLOGY & ADDITIONAL STUDIES:  Reviewed     MEDICATIONS  (STANDING):  dexAMETHasone  Injectable 6 milliGRAM(s) IV Push daily  dextrose 40% Gel 15 Gram(s) Oral once  dextrose 5%. 1000 milliLiter(s) (50 mL/Hr) IV Continuous <Continuous>  dextrose 5%. 1000 milliLiter(s) (100 mL/Hr) IV Continuous <Continuous>  dextrose 50% Injectable 25 Gram(s) IV Push once  dextrose 50% Injectable 12.5 Gram(s) IV Push once  dextrose 50% Injectable 25 Gram(s) IV Push once  diltiazem    milliGRAM(s) Oral daily  enoxaparin Injectable 40 milliGRAM(s) SubCutaneous daily  FIRST- Mouthwash  BLM 5 milliLiter(s) Swish and Spit two times a day  glucagon  Injectable 1 milliGRAM(s) IntraMuscular once  insulin lispro (ADMELOG) corrective regimen sliding scale   SubCutaneous three times a day before meals  insulin lispro (ADMELOG) corrective regimen sliding scale   SubCutaneous at bedtime  lactated ringers. 1000 milliLiter(s) (100 mL/Hr) IV Continuous <Continuous>  levothyroxine 50 MICROGram(s) Oral daily  multivitamin 1 Tablet(s) Oral daily  pantoprazole  Injectable 40 milliGRAM(s) IV Push daily  tamsulosin 0.4 milliGRAM(s) Oral at bedtime    MEDICATIONS  (PRN):  acetaminophen     Tablet .. 650 milliGRAM(s) Oral every 4 hours PRN Temp greater or equal to 38.5C (101.3F)  ALBUTerol    90 MICROgram(s) HFA Inhaler 2 Puff(s) Inhalation every 4 hours PRN Shortness of Breath and/or Wheezing  benzonatate 100 milliGRAM(s) Oral three times a day PRN Cough  guaifenesin/dextromethorphan Oral Liquid 10 milliLiter(s) Oral every 4 hours PRN Cough        
Pneumonia due to organism    HPI:  89 y/o female with PMH of AFIB (no longer on Xarelto due to risk of fall), hypothyroid came to the ED for shortness of breath. Patient is very hard of hearing, HPI obtained from son (Kobi via phone). As per son, patient has not been in her normal state (which is very agile, A/O x3, able to do ADLs) in the last 4 days. She has shortness of breath, fever, cough, HA; family have been trying to manage her but today, she was hypoxic to 83% on RA which prompted ED visit. Of note, son said his sister who the patient lives with had covid 2 weeks ago. Patient was not checked for covid, and she is not vaccinated against covid. No nausea, vomiting, abdominal pain, change in bowel/urinary habit, chest pain, fall reported. (23 Jan 2022 20:42)    Interval History:  Patient was seen and examined at bedside around 9 am.   No active complaints.  Denies shortness of breath, chest pain, palpitations or cough.   Wants to go to the bathroom.     ROS:  As per interval history otherwise unremarkable.    PHYSICAL EXAM:  Vital Signs   T(C): 36.5 (02 Feb 2022 09:45), Max: 36.6 (01 Feb 2022 21:21)  T(F): 97.7 (02 Feb 2022 09:45), Max: 97.8 (01 Feb 2022 21:21)  HR: 71 (02 Feb 2022 09:45) (71 - 78)  BP: 126/72 (02 Feb 2022 09:45) (126/72 - 170/83)  RR: 18 (02 Feb 2022 09:45) (18 - 18)  SpO2: 89% (02 Feb 2022 09:45) (89% - 92%)  General: Elderly female lying in bed comfortably. No acute distress. Emaciated.   HEENT: EOMI. Clear conjunctivae. Moist mucus membrane  Neck: Supple.   Chest: Good air entry. No wheezing, rales or rhonchi.   Heart: Normal S1 & S2. RRR.   Abdomen: Soft. Non-tender. Non-distended. + BS  Ext: No pedal edema. No calf tenderness. Swollen. No erythema or warmth.    Neuro: Woke her up from sleep. Moves all four extremities. No speech disorder  Skin: Warm and Dry  Psychiatry: Anxious     LABS:  CAPILLARY BLOOD GLUCOSE  POCT Blood Glucose.: 142 mg/dL (02 Feb 2022 13:01)  POCT Blood Glucose.: 128 mg/dL (02 Feb 2022 08:53)  POCT Blood Glucose.: 154 mg/dL (01 Feb 2022 21:53)  POCT Blood Glucose.: 178 mg/dL (01 Feb 2022 17:13)                        15.2   10.26 )-----------( CLUMPED    ( 02 Feb 2022 08:26 )             45.0     02-02    144  |  108<H>  |  27.1<H>  ----------------------------<  128<H>  3.6   |  24.0  |  0.51    Ca    8.0<L>      02 Feb 2022 08:26    TPro  4.9<L>  /  Alb  2.4<L>  /  TBili  0.6  /  DBili  x   /  AST  17  /  ALT  15  /  AlkPhos  70  02-02    RADIOLOGY & ADDITIONAL STUDIES:  Reviewed     MEDICATIONS  (STANDING):  dextrose 40% Gel 15 Gram(s) Oral once  dextrose 5%. 1000 milliLiter(s) (50 mL/Hr) IV Continuous <Continuous>  dextrose 5%. 1000 milliLiter(s) (100 mL/Hr) IV Continuous <Continuous>  dextrose 50% Injectable 25 Gram(s) IV Push once  dextrose 50% Injectable 12.5 Gram(s) IV Push once  dextrose 50% Injectable 25 Gram(s) IV Push once  diltiazem    Tablet 60 milliGRAM(s) Oral every 8 hours  enoxaparin Injectable 40 milliGRAM(s) SubCutaneous daily  FIRST- Mouthwash  BLM 5 milliLiter(s) Swish and Spit two times a day  glucagon  Injectable 1 milliGRAM(s) IntraMuscular once  insulin lispro (ADMELOG) corrective regimen sliding scale   SubCutaneous three times a day before meals  insulin lispro (ADMELOG) corrective regimen sliding scale   SubCutaneous at bedtime  lactated ringers. 1000 milliLiter(s) (75 mL/Hr) IV Continuous <Continuous>  levothyroxine 50 MICROGram(s) Oral daily  multivitamin 1 Tablet(s) Oral daily  pantoprazole  Injectable 40 milliGRAM(s) IV Push daily  potassium chloride   Powder 40 milliEquivalent(s) Oral once  tamsulosin 0.4 milliGRAM(s) Oral at bedtime    MEDICATIONS  (PRN):  acetaminophen     Tablet .. 650 milliGRAM(s) Oral every 4 hours PRN Temp greater or equal to 38.5C (101.3F)  ALBUTerol    90 MICROgram(s) HFA Inhaler 2 Puff(s) Inhalation every 4 hours PRN Shortness of Breath and/or Wheezing  benzonatate 100 milliGRAM(s) Oral three times a day PRN Cough  guaifenesin/dextromethorphan Oral Liquid 10 milliLiter(s) Oral every 4 hours PRN Cough                  
Pneumonia due to organism    HPI:  89 y/o female with PMH of AFIB (no longer on Xarelto due to risk of fall), hypothyroid came to the ED for shortness of breath. Patient is very hard of hearing, HPI obtained from son (Kobi via phone). As per son, patient has not been in her normal state (which is very agile, A/O x3, able to do ADLs) in the last 4 days. She has shortness of breath, fever, cough, HA; family have been trying to manage her but today, she was hypoxic to 83% on RA which prompted ED visit. Of note, son said his sister who the patient lives with had covid 2 weeks ago. Patient was not checked for covid, and she is not vaccinated against covid. No nausea, vomiting, abdominal pain, change in bowel/urinary habit, chest pain, fall reported. (23 Jan 2022 20:42)    Interval History:  Patient was seen and examined at bedside around 9 am.   Woke her up from sleep.  Feels better.   Denies shortness of breath, chest pain, palpitations or cough.     ROS:  As per interval history otherwise unremarkable.    PHYSICAL EXAM:  Vital Signs   T(C): 36.5 (31 Jan 2022 10:59), Max: 36.6 (31 Jan 2022 04:32)  T(F): 97.7 (31 Jan 2022 10:59), Max: 97.8 (31 Jan 2022 04:32)  HR: 79 (31 Jan 2022 10:59) (58 - 79)  BP: 133/89 (31 Jan 2022 10:59) (123/74 - 150/80)  RR: 18 (31 Jan 2022 10:59) (18 - 18)  SpO2: 92% (31 Jan 2022 10:59) (92% - 97%)  General: Elderly female lying in bed comfortably. No acute distress. Emaciated.   HEENT: EOMI. Clear conjunctivae. Moist mucus membrane  Neck: Supple.   Chest: Good air entry. No wheezing, rales or rhonchi.   Heart: Normal S1 & S2. RRR.   Abdomen: Soft. Non-tender. Non-distended. + BS  Ext: No pedal edema. No calf tenderness   Neuro: Woke her up from sleep. Moves all four extremities. No speech disorder  Skin: Warm and Dry  Psychiatry: Normal mood and affect    LABS:  CAPILLARY BLOOD GLUCOSE  POCT Blood Glucose.: 144 mg/dL (31 Jan 2022 08:18)  POCT Blood Glucose.: 256 mg/dL (30 Jan 2022 21:21)  POCT Blood Glucose.: 174 mg/dL (30 Jan 2022 17:51)  POCT Blood Glucose.: 221 mg/dL (30 Jan 2022 13:34)                        16.4   8.29  )-----------( Clumped    ( 31 Jan 2022 07:09 )             49.5     01-31    143  |  105  |  24.0<H>  ----------------------------<  128<H>  3.6   |  26.0  |  0.42<L>    Ca    8.2<L>      31 Jan 2022 07:09  Mg     2.1     01-30    TPro  5.5<L>  /  Alb  2.7<L>  /  TBili  0.5  /  DBili  x   /  AST  14  /  ALT  16  /  AlkPhos  74  01-31    RADIOLOGY & ADDITIONAL STUDIES:  Reviewed     MEDICATIONS  (STANDING):  dexAMETHasone  Injectable 6 milliGRAM(s) IV Push daily  dextrose 40% Gel 15 Gram(s) Oral once  dextrose 5%. 1000 milliLiter(s) (50 mL/Hr) IV Continuous <Continuous>  dextrose 5%. 1000 milliLiter(s) (100 mL/Hr) IV Continuous <Continuous>  dextrose 50% Injectable 25 Gram(s) IV Push once  dextrose 50% Injectable 12.5 Gram(s) IV Push once  dextrose 50% Injectable 25 Gram(s) IV Push once  diltiazem    Tablet 60 milliGRAM(s) Oral two times a day  diltiazem    Tablet 60 milliGRAM(s) Oral once  enoxaparin Injectable 40 milliGRAM(s) SubCutaneous daily  FIRST- Mouthwash  BLM 5 milliLiter(s) Swish and Spit two times a day  glucagon  Injectable 1 milliGRAM(s) IntraMuscular once  insulin lispro (ADMELOG) corrective regimen sliding scale   SubCutaneous three times a day before meals  insulin lispro (ADMELOG) corrective regimen sliding scale   SubCutaneous at bedtime  lactated ringers. 1000 milliLiter(s) (75 mL/Hr) IV Continuous <Continuous>  levothyroxine 50 MICROGram(s) Oral daily  multivitamin 1 Tablet(s) Oral daily  pantoprazole  Injectable 40 milliGRAM(s) IV Push daily  remdesivir  IVPB 100 milliGRAM(s) IV Intermittent every 24 hours  tamsulosin 0.4 milliGRAM(s) Oral at bedtime    MEDICATIONS  (PRN):  acetaminophen     Tablet .. 650 milliGRAM(s) Oral every 4 hours PRN Temp greater or equal to 38.5C (101.3F)  ALBUTerol    90 MICROgram(s) HFA Inhaler 2 Puff(s) Inhalation every 4 hours PRN Shortness of Breath and/or Wheezing  benzonatate 100 milliGRAM(s) Oral three times a day PRN Cough  guaifenesin/dextromethorphan Oral Liquid 10 milliLiter(s) Oral every 4 hours PRN Cough              
Pneumonia due to organism    HPI:  89 y/o female with PMH of AFIB (no longer on Xarelto due to risk of fall), hypothyroid came to the ED for shortness of breath. Patient is very hard of hearing, HPI obtained from son (Kobi via phone). As per son, patient has not been in her normal state (which is very agile, A/O x3, able to do ADLs) in the last 4 days. She has shortness of breath, fever, cough, HA; family have been trying to manage her but today, she was hypoxic to 83% on RA which prompted ED visit. Of note, son said his sister who the patient lives with had covid 2 weeks ago. Patient was not checked for covid, and she is not vaccinated against covid. No nausea, vomiting, abdominal pain, change in bowel/urinary habit, chest pain, fall reported. (23 Jan 2022 20:42)    Interval History:  Patient was seen and examined at bedside around 9:15 am.  Feeling hungry. Asking for breakfast.   Denies shortness of breath, chest pain, palpitations, nausea, vomiting or abdominal pain.     ROS:  As per interval history otherwise unremarkable.     PHYSICAL EXAM:  Vital Signs  T(C): 36.3 (05 Feb 2022 09:55), Max: 36.6 (04 Feb 2022 19:45)  T(F): 97.4 (05 Feb 2022 09:55), Max: 97.9 (05 Feb 2022 04:59)  HR: 71 (05 Feb 2022 09:55) (71 - 94)  BP: 132/63 (05 Feb 2022 09:55) (129/57 - 158/77)  RR: 19 (05 Feb 2022 09:55) (18 - 19)  SpO2: 95% (05 Feb 2022 09:55) (92% - 96%)  General: Elderly female sitting in bed comfortably. No acute distress. Emaciated.   HEENT: Clear conjunctivae. Moist mucus membrane. Hard of hearing.   Neck: Supple.   Chest: Good air entry. No wheezing, rales or rhonchi.   Heart: Normal S1 & S2. RRR.   Abdomen: Soft. Non-tender. Non-distended. + BS  Ext: No pedal edema. Swollen. No erythema or warmth.    Neuro: Awake   Skin: Warm and Dry  Psychiatry: Calm at the time of exam     LABS:  CAPILLARY BLOOD GLUCOSE  POCT Blood Glucose.: 104 mg/dL (05 Feb 2022 08:22)  POCT Blood Glucose.: 82 mg/dL (04 Feb 2022 22:15)  POCT Blood Glucose.: 90 mg/dL (04 Feb 2022 16:14)  POCT Blood Glucose.: 117 mg/dL (04 Feb 2022 11:36)                      14.0   9.71  )-----------( 141      ( 04 Feb 2022 18:33 )             42.1     RADIOLOGY & ADDITIONAL STUDIES:  Reviewed     MEDICATIONS  (STANDING):  dextrose 40% Gel 15 Gram(s) Oral once  dextrose 5%. 1000 milliLiter(s) (50 mL/Hr) IV Continuous <Continuous>  dextrose 5%. 1000 milliLiter(s) (100 mL/Hr) IV Continuous <Continuous>  dextrose 50% Injectable 25 Gram(s) IV Push once  dextrose 50% Injectable 12.5 Gram(s) IV Push once  dextrose 50% Injectable 25 Gram(s) IV Push once  diltiazem    Tablet 60 milliGRAM(s) Oral every 8 hours  enoxaparin Injectable 40 milliGRAM(s) SubCutaneous daily  FIRST- Mouthwash  BLM 5 milliLiter(s) Swish and Spit two times a day  glucagon  Injectable 1 milliGRAM(s) IntraMuscular once  insulin lispro (ADMELOG) corrective regimen sliding scale   SubCutaneous three times a day before meals  insulin lispro (ADMELOG) corrective regimen sliding scale   SubCutaneous at bedtime  levothyroxine 50 MICROGram(s) Oral daily  multivitamin 1 Tablet(s) Oral daily  potassium chloride   Powder 40 milliEquivalent(s) Oral once  QUEtiapine 12.5 milliGRAM(s) Oral at bedtime  tamsulosin 0.4 milliGRAM(s) Oral at bedtime    MEDICATIONS  (PRN):  acetaminophen     Tablet .. 650 milliGRAM(s) Oral every 4 hours PRN Temp greater or equal to 38.5C (101.3F)  ALBUTerol    90 MICROgram(s) HFA Inhaler 2 Puff(s) Inhalation every 4 hours PRN Shortness of Breath and/or Wheezing  benzonatate 100 milliGRAM(s) Oral three times a day PRN Cough  guaifenesin/dextromethorphan Oral Liquid 10 milliLiter(s) Oral every 4 hours PRN Cough  haloperidol    Injectable 2 milliGRAM(s) IntraMuscular every 6 hours PRN Agitation                        
[facility] Division of Hospital Medicine    Chief Complaint: SOB, COVID     SUBJECTIVE / OVERNIGHT EVENTS:   Pt reports she feels more tired today  Overnight was hypoxic on 6L, now on NRB     Patient denies chest pain, abd pain, N/V, fever, chills, dysuria or any other complaints. Limited ROS      MEDICATIONS  (STANDING):  dexAMETHasone  Injectable 6 milliGRAM(s) IV Push daily  diltiazem    milliGRAM(s) Oral daily  enoxaparin Injectable 40 milliGRAM(s) SubCutaneous daily  levothyroxine 50 MICROGram(s) Oral daily  remdesivir  IVPB   IV Intermittent   remdesivir  IVPB 100 milliGRAM(s) IV Intermittent every 24 hours  tamsulosin 0.4 milliGRAM(s) Oral at bedtime    MEDICATIONS  (PRN):  acetaminophen     Tablet .. 650 milliGRAM(s) Oral every 4 hours PRN Temp greater or equal to 38.5C (101.3F)  ALBUTerol    90 MICROgram(s) HFA Inhaler 2 Puff(s) Inhalation every 4 hours PRN Shortness of Breath and/or Wheezing  benzonatate 100 milliGRAM(s) Oral three times a day PRN Cough  guaifenesin/dextromethorphan Oral Liquid 10 milliLiter(s) Oral every 4 hours PRN Cough        I&O's Summary      PHYSICAL EXAM:  Vital Signs Last 24 Hrs  T(C): 36.3 (2022 05:00), Max: 36.4 (2022 19:23)  T(F): 97.4 (2022 05:00), Max: 97.5 (2022 19:23)  HR: 67 (2022 05:00) (67 - 81)  BP: 97/50 (2022 05:00) (97/50 - 129/85)  BP(mean): --  RR: 16 (2022 05:00) (16 - 20)  SpO2: 98% (2022 07:04) (83% - 98%)        CONSTITUTIONAL: Appears tired, sitting up in bed   ENMT: Dry oral mucosa, very [place], on NC   RESPIRATORY: Normal respiratory effort; lungs with reduced air entry bilaterally  CARDIOVASCULAR: Regular rate and rhythm, normal S1 and S2, No lower extremity edema  ABDOMEN: Nontender to palpation, normoactive bowel sounds  MUSCULOSKELETAL: No clubbing or cyanosis of digits   PSYCH: A+O to person and place, not time; anxious   NEUROLOGY: No gross sensory or motor deficits   SKIN: No rashes       LABS:                        14.5   5.70  )-----------( 448      ( 2022 07:50 )             44.5         144  |  105  |  48.6<H>  ----------------------------<  154<H>  3.6   |  28.0  |  0.72    Ca    8.6      2022 07:50    TPro  6.2<L>  /  Alb  3.0<L>  /  TBili  0.4  /  DBili  x   /  AST    /  ALT  25  /  AlkPhos  77            Urinalysis Basic - ( 2022 21:07 )    Color: Yellow / Appearance: Clear / S.015 / pH: x  Gluc: x / Ketone: Negative  / Bili: Negative / Urobili: 1 mg/dL   Blood: x / Protein: 30 mg/dL / Nitrite: Negative   Leuk Esterase: Negative / RBC: 0-2 /HPF / WBC 0-2 /HPF   Sq Epi: x / Non Sq Epi: Occasional / Bacteria: x        Culture - Blood (collected 2022 21:39)  Source: .Blood Blood  Preliminary Report (2022 23:00):    No growth at 48 hours    Culture - Blood (collected 2022 21:39)  Source: .Blood Blood  Preliminary Report (2022 23:00):    No growth at 48 hours  
[facility]  INFECTIOUS DISEASES AND INTERNAL MEDICINE at [place] and [place]  =======================================================                               Radames Gutierrez MD#  Duc Mancini MD*                                     Irlanda Lee MD*    Teetee Miguel MD*            Diplomates American Board of Internal Medicine & Infectious Diseases                # [place] Office - Appt - Tel  [phone] Fax [phone]                * [place] Office - Appt - Tel [phone] Fax [phone]                                  Hospital Consult line:  [phone]  =======================================================    JUVENTINO OCHOA [id number]    Follow up: COVID-19 infection    remains confused       Allergies:  No Known Allergies       REVIEW OF SYSTEMS:  Unable to obtain due to medical condition       Physical Exam:  GEN: NAD, on HFNC   HEENT: normocephalic and atraumatic. EOMI. PERRL.  Anicteric  NECK: Supple.   LUNGS: Coarse BS B/L  HEART: Regular rate and rhythm   ABDOMEN: Soft, nontender, and nondistended.  Positive bowel sounds.    : No CVA tenderness  EXTREMITIES: Without any edema.  MSK: No joint swelling  NEUROLOGIC:  No Focal Deficits  PSYCHIATRIC: Confused   SKIN: No Rash      Vitals:  T(F): 98.3 (28 Jan 2022 09:35), Max: 98.3 (28 Jan 2022 09:35)  HR: 60 (28 Jan 2022 09:35)  BP: 125/64 (28 Jan 2022 09:35)  RR: 20 (28 Jan 2022 09:35)  SpO2: 93% (28 Jan 2022 09:35) (91% - 100%)  temp max in last 48H T(F): , Max: 98.3 (01-28-22 @ 09:35)      Current Antibiotics:    Other medications:  dexAMETHasone  Injectable 6 milliGRAM(s) IV Push daily  dextrose 40% Gel 15 Gram(s) Oral once  dextrose 5%. 1000 milliLiter(s) IV Continuous <Continuous>  dextrose 5%. 1000 milliLiter(s) IV Continuous <Continuous>  dextrose 50% Injectable 25 Gram(s) IV Push once  dextrose 50% Injectable 12.5 Gram(s) IV Push once  dextrose 50% Injectable 25 Gram(s) IV Push once  diltiazem    milliGRAM(s) Oral daily  enoxaparin Injectable 40 milliGRAM(s) SubCutaneous daily  FIRST- Mouthwash  BLM 5 milliLiter(s) Swish and Spit two times a day  glucagon  Injectable 1 milliGRAM(s) IntraMuscular once  insulin lispro (ADMELOG) corrective regimen sliding scale   SubCutaneous three times a day before meals  insulin lispro (ADMELOG) corrective regimen sliding scale   SubCutaneous at bedtime  lactated ringers. 1000 milliLiter(s) IV Continuous <Continuous>  levothyroxine 50 MICROGram(s) Oral daily  multivitamin 1 Tablet(s) Oral daily  pantoprazole  Injectable 40 milliGRAM(s) IV Push daily  tamsulosin 0.4 milliGRAM(s) Oral at bedtime               13.2   6.26  )-----------( 397      ( 27 Jan 2022 10:27 )             41.7     01-28    143  |  107  |  26.4<H>  ----------------------------<  155<H>  3.8   |  28.0  |  0.45<L>    Ca    8.1<L>      28 Jan 2022 08:09    TPro  5.3<L>  /  Alb  2.7<L>  /  TBili  0.3<L>  /  DBili  x   /  AST  15  /  ALT  19  /  AlkPhos  64  01-28    RECENT CULTURES:  01-23 @ 21:39 .Blood Blood     No growth at 48 hours      WBC Count: 6.26 K/uL (01-27-22 @ 10:27)  WBC Count: 5.70 K/uL (01-26-22 @ 07:50)  WBC Count: 4.54 K/uL (01-25-22 @ 09:10)  WBC Count: 2.41 K/uL (01-24-22 @ 10:23)  WBC Count: 5.25 K/uL (01-23-22 @ 18:42)    Creatinine, Serum: 0.45 mg/dL (01-28-22 @ 08:09)  Creatinine, Serum: 0.60 mg/dL (01-27-22 @ 08:30)  Creatinine, Serum: 0.72 mg/dL (01-26-22 @ 07:50)  Creatinine, Serum: 0.76 mg/dL (01-25-22 @ 09:10)  Creatinine, Serum: 0.58 mg/dL (01-24-22 @ 10:23)  Creatinine, Serum: 0.58 mg/dL (01-23-22 @ 18:42)    C-Reactive Protein, Serum: 24 mg/L (01-27-22 @ 08:30)  C-Reactive Protein, Serum: 70 mg/L (01-25-22 @ 09:10)    Ferritin, Serum: 230 ng/mL (01-27-22 @ 08:30)  Ferritin, Serum: 280 ng/mL (01-25-22 @ 09:10)    Procalcitonin, Serum: 0.05 ng/mL (01-27-22 @ 13:37)     SARS-CoV-2: Detected (01-23-22 @ 18:44)  Rapid RVP Result: Detected (01-23-22 @ 18:44)    SARS-CoV-2: Detected (01-23-22 @ 18:44)    
Pneumonia due to organism    HPI:  89 y/o female with PMH of AFIB (no longer on Xarelto due to risk of fall), hypothyroid came to the ED for shortness of breath. Patient is very hard of hearing, HPI obtained from son (Kobi via phone). As per son, patient has not been in her normal state (which is very agile, A/O x3, able to do ADLs) in the last 4 days. She has shortness of breath, fever, cough, HA; family have been trying to manage her but today, she was hypoxic to 83% on RA which prompted ED visit. Of note, son said his sister who the patient lives with had covid 2 weeks ago. Patient was not checked for covid, and she is not vaccinated against covid. No nausea, vomiting, abdominal pain, change in bowel/urinary habit, chest pain, fall reported. (23 Jan 2022 20:42)    Interval History:  Patient was seen and examined at bedside around 8:45 am.   Calm today.  Wants to sit in chair for breakfast.   Feels OK.   Denies shortness of breath, chest pain, palpitations or cough.     ROS:  As per interval history otherwise unremarkable.    PHYSICAL EXAM:  Vital Signs   T(C): 36.4 (03 Feb 2022 09:20), Max: 36.8 (02 Feb 2022 20:45)  T(F): 97.5 (03 Feb 2022 09:20), Max: 98.3 (02 Feb 2022 20:45)  HR: 68 (03 Feb 2022 09:20) (68 - 91)  BP: 122/85 (03 Feb 2022 09:20) (113/72 - 165/80)  RR: 18 (03 Feb 2022 09:20) (18 - 18)  SpO2: 90% (03 Feb 2022 09:20) (88% - 92%)  General: Elderly female lying in bed comfortably. No acute distress. Emaciated.   HEENT: EOMI. Clear conjunctivae. Moist mucus membrane. Hard of hearing.   Neck: Supple.   Chest: Good air entry. No wheezing, rales or rhonchi.   Heart: Normal S1 & S2. RRR.   Abdomen: Soft. Non-tender. Non-distended. + BS  Ext: No pedal edema. No calf tenderness. Swollen. No erythema or warmth.    Neuro: Awake. Moves all four extremities. No speech disorder  Skin: Warm and Dry  Psychiatry: Normal mood and affect     LABS:  CAPILLARY BLOOD GLUCOSE  POCT Blood Glucose.: 217 mg/dL (03 Feb 2022 11:35)  POCT Blood Glucose.: 134 mg/dL (03 Feb 2022 08:35)  POCT Blood Glucose.: 179 mg/dL (02 Feb 2022 20:46)  POCT Blood Glucose.: 142 mg/dL (02 Feb 2022 13:01)                        15.2   10.26 )-----------( CLUMPED    ( 02 Feb 2022 08:26 )             45.0     02-02    144  |  108<H>  |  27.1<H>  ----------------------------<  128<H>  3.6   |  24.0  |  0.51    Ca    8.0<L>      02 Feb 2022 08:26    TPro  4.9<L>  /  Alb  2.4<L>  /  TBili  0.6  /  DBili  x   /  AST  17  /  ALT  15  /  AlkPhos  70  02-02    RADIOLOGY & ADDITIONAL STUDIES:  Reviewed     MEDICATIONS  (STANDING):  dextrose 40% Gel 15 Gram(s) Oral once  dextrose 5%. 1000 milliLiter(s) (50 mL/Hr) IV Continuous <Continuous>  dextrose 5%. 1000 milliLiter(s) (100 mL/Hr) IV Continuous <Continuous>  dextrose 50% Injectable 25 Gram(s) IV Push once  dextrose 50% Injectable 12.5 Gram(s) IV Push once  dextrose 50% Injectable 25 Gram(s) IV Push once  diltiazem    Tablet 60 milliGRAM(s) Oral every 8 hours  enoxaparin Injectable 40 milliGRAM(s) SubCutaneous daily  FIRST- Mouthwash  BLM 5 milliLiter(s) Swish and Spit two times a day  glucagon  Injectable 1 milliGRAM(s) IntraMuscular once  insulin lispro (ADMELOG) corrective regimen sliding scale   SubCutaneous three times a day before meals  insulin lispro (ADMELOG) corrective regimen sliding scale   SubCutaneous at bedtime  lactated ringers. 1000 milliLiter(s) (75 mL/Hr) IV Continuous <Continuous>  levothyroxine 50 MICROGram(s) Oral daily  multivitamin 1 Tablet(s) Oral daily  pantoprazole  Injectable 40 milliGRAM(s) IV Push daily  potassium chloride   Powder 40 milliEquivalent(s) Oral once  QUEtiapine 25 milliGRAM(s) Oral at bedtime  tamsulosin 0.4 milliGRAM(s) Oral at bedtime    MEDICATIONS  (PRN):  acetaminophen     Tablet .. 650 milliGRAM(s) Oral every 4 hours PRN Temp greater or equal to 38.5C (101.3F)  ALBUTerol    90 MICROgram(s) HFA Inhaler 2 Puff(s) Inhalation every 4 hours PRN Shortness of Breath and/or Wheezing  benzonatate 100 milliGRAM(s) Oral three times a day PRN Cough  guaifenesin/dextromethorphan Oral Liquid 10 milliLiter(s) Oral every 4 hours PRN Cough  haloperidol    Injectable 2 milliGRAM(s) IntraMuscular every 6 hours PRN Agitation                    
Pneumonia due to organism    HPI:  89 y/o female with PMH of AFIB (no longer on Xarelto due to risk of fall), hypothyroid came to the ED for shortness of breath. Patient is very hard of hearing, HPI obtained from son (Kobi via phone). As per son, patient has not been in her normal state (which is very agile, A/O x3, able to do ADLs) in the last 4 days. She has shortness of breath, fever, cough, HA; family have been trying to manage her but today, she was hypoxic to 83% on RA which prompted ED visit. Of note, son said his sister who the patient lives with had covid 2 weeks ago. Patient was not checked for covid, and she is not vaccinated against covid. No nausea, vomiting, abdominal pain, change in bowel/urinary habit, chest pain, fall reported. (23 Jan 2022 20:42)    Interval History:  Patient was seen and examined at bedside around 9:15 am.   Sleeping.   No overnight issues.     ROS:  Unable to obtain.     PHYSICAL EXAM:  Vital Signs   T(C): 37.1 (04 Feb 2022 04:30), Max: 37.1 (04 Feb 2022 04:30)  T(F): 98.7 (04 Feb 2022 04:30), Max: 98.7 (04 Feb 2022 04:30)  HR: 67 (04 Feb 2022 04:30) (67 - 75)  BP: 132/72 (04 Feb 2022 04:30) (132/72 - 145/78)  RR: 18 (04 Feb 2022 04:30) (17 - 19)  SpO2: 94% (04 Feb 2022 04:30) (91% - 94%)  General: Elderly female lying in bed comfortably. No acute distress. Emaciated.   HEENT: Clear conjunctivae. Moist mucus membrane.   Neck: Supple.   Chest: Good air entry. No wheezing, rales or rhonchi.   Heart: Normal S1 & S2. RRR.   Abdomen: Soft. Non-tender. Non-distended. + BS  Ext: No pedal edema. Swollen. No erythema or warmth.    Neuro: Sleeping   Skin: Warm and Dry  Psychiatry: Unable to assess     LABS:  CAPILLARY BLOOD GLUCOSE  POCT Blood Glucose.: 77 mg/dL (04 Feb 2022 09:15)  POCT Blood Glucose.: 129 mg/dL (03 Feb 2022 22:31)  POCT Blood Glucose.: 146 mg/dL (03 Feb 2022 17:08)  POCT Blood Glucose.: 217 mg/dL (03 Feb 2022 11:35)                      15.2   10.26 )-----------( CLUMPED    ( 02 Feb 2022 08:26 )             45.0     02-02    144  |  108<H>  |  27.1<H>  ----------------------------<  128<H>  3.6   |  24.0  |  0.51    Ca    8.0<L>      02 Feb 2022 08:26    TPro  4.9<L>  /  Alb  2.4<L>  /  TBili  0.6  /  DBili  x   /  AST  17  /  ALT  15  /  AlkPhos  70  02-02    RADIOLOGY & ADDITIONAL STUDIES:  Reviewed     MEDICATIONS  (STANDING):  dextrose 40% Gel 15 Gram(s) Oral once  dextrose 5%. 1000 milliLiter(s) (50 mL/Hr) IV Continuous <Continuous>  dextrose 5%. 1000 milliLiter(s) (100 mL/Hr) IV Continuous <Continuous>  dextrose 50% Injectable 25 Gram(s) IV Push once  dextrose 50% Injectable 12.5 Gram(s) IV Push once  dextrose 50% Injectable 25 Gram(s) IV Push once  diltiazem    Tablet 60 milliGRAM(s) Oral every 8 hours  enoxaparin Injectable 40 milliGRAM(s) SubCutaneous daily  FIRST- Mouthwash  BLM 5 milliLiter(s) Swish and Spit two times a day  glucagon  Injectable 1 milliGRAM(s) IntraMuscular once  insulin lispro (ADMELOG) corrective regimen sliding scale   SubCutaneous three times a day before meals  insulin lispro (ADMELOG) corrective regimen sliding scale   SubCutaneous at bedtime  levothyroxine 50 MICROGram(s) Oral daily  multivitamin 1 Tablet(s) Oral daily  potassium chloride   Powder 40 milliEquivalent(s) Oral once  QUEtiapine 25 milliGRAM(s) Oral at bedtime  tamsulosin 0.4 milliGRAM(s) Oral at bedtime    MEDICATIONS  (PRN):  acetaminophen     Tablet .. 650 milliGRAM(s) Oral every 4 hours PRN Temp greater or equal to 38.5C (101.3F)  ALBUTerol    90 MICROgram(s) HFA Inhaler 2 Puff(s) Inhalation every 4 hours PRN Shortness of Breath and/or Wheezing  benzonatate 100 milliGRAM(s) Oral three times a day PRN Cough  guaifenesin/dextromethorphan Oral Liquid 10 milliLiter(s) Oral every 4 hours PRN Cough  haloperidol    Injectable 2 milliGRAM(s) IntraMuscular every 6 hours PRN Agitation                      
[facility]  INFECTIOUS DISEASES AND INTERNAL MEDICINE at [place] and [place]  =======================================================                               Radames Gutierrez MD#  Duc Mancini MD*                                     Irlanda Lee MD*    Teetee Miguel MD*            Diplomates American Board of Internal Medicine & Infectious Diseases                # [place] Office - Appt - Tel  [phone] Fax [phone]                * [place] Office - Appt - Tel [phone] Fax [phone]                                  Hospital Consult line:  [phone]  =======================================================    JUVENTINO OCHOA [id number]    Follow up: COVID-19 infection    Requiring less O2       Allergies:  No Known Allergies       REVIEW OF SYSTEMS:  CONSTITUTIONAL:  No Fever or chills  HEENT:  No diplopia or blurred vision.  No earache, sore throat or runny nose.  CARDIOVASCULAR:  No chest pain   RESPIRATORY:  + cough, + shortness of breath  GASTROINTESTINAL:  No nausea, vomiting or diarrhea.  GENITOURINARY:  No dysuria, frequency or urgency.   MUSCULOSKELETAL:  no joint aches, no muscle pain  SKIN:  No change in skin, hair or nails.  NEUROLOGIC:  No Headaches, seizures   ENDOCRINE:  No heat or cold intolerance  HEMATOLOGICAL:  No easy bruising or bleeding.     Physical Exam:  GEN: NAD  HEENT: normocephalic and atraumatic. EOMI. PERRL.  Anicteric  NECK: Supple.   LUNGS: Coarse BS B/L  HEART: Regular rate and rhythm   ABDOMEN: Soft, nontender, and nondistended.  Positive bowel sounds.    : No CVA tenderness  EXTREMITIES: Without any edema.  MSK: No joint swelling  NEUROLOGIC:  No Focal Deficits  SKIN: No Rash      Vitals:    T(F): 97.7 (31 Jan 2022 10:59), Max: 97.8 (31 Jan 2022 04:32)  HR: 79 (31 Jan 2022 10:59)  BP: 133/89 (31 Jan 2022 10:59)  RR: 18 (31 Jan 2022 10:59)  SpO2: 92% (31 Jan 2022 10:59) (92% - 97%)  temp max in last 48H T(F): , Max: 97.8 (01-31-22 @ 04:32)    Current Antibiotics:  remdesivir  IVPB 100 milliGRAM(s) IV Intermittent every 24 hours    Other medications:  dexAMETHasone  Injectable 6 milliGRAM(s) IV Push daily  dextrose 40% Gel 15 Gram(s) Oral once  dextrose 5%. 1000 milliLiter(s) IV Continuous <Continuous>  dextrose 5%. 1000 milliLiter(s) IV Continuous <Continuous>  dextrose 50% Injectable 25 Gram(s) IV Push once  dextrose 50% Injectable 12.5 Gram(s) IV Push once  dextrose 50% Injectable 25 Gram(s) IV Push once  diltiazem    Tablet 60 milliGRAM(s) Oral two times a day  enoxaparin Injectable 40 milliGRAM(s) SubCutaneous daily  FIRST- Mouthwash  BLM 5 milliLiter(s) Swish and Spit two times a day  glucagon  Injectable 1 milliGRAM(s) IntraMuscular once  insulin lispro (ADMELOG) corrective regimen sliding scale   SubCutaneous three times a day before meals  insulin lispro (ADMELOG) corrective regimen sliding scale   SubCutaneous at bedtime  lactated ringers. 1000 milliLiter(s) IV Continuous <Continuous>  levothyroxine 50 MICROGram(s) Oral daily  multivitamin 1 Tablet(s) Oral daily  pantoprazole  Injectable 40 milliGRAM(s) IV Push daily  potassium chloride   Powder 40 milliEquivalent(s) Oral once  tamsulosin 0.4 milliGRAM(s) Oral at bedtime                            16.4   8.29  )-----------( Clumped    ( 31 Jan 2022 07:09 )             49.5     01-31    143  |  105  |  24.0<H>  ----------------------------<  128<H>  3.6   |  26.0  |  0.42<L>    Ca    8.2<L>      31 Jan 2022 07:09  Mg     2.1     01-30    TPro  5.5<L>  /  Alb  2.7<L>  /  TBili  0.5  /  DBili  x   /  AST  14  /  ALT  16  /  AlkPhos  74  01-31    RECENT CULTURES:  01-23 @ 21:39 .Blood Blood     No growth at 5 days.      WBC Count: 8.29 K/uL (01-31-22 @ 07:09)  WBC Count: 6.74 K/uL (01-29-22 @ 09:10)  WBC Count: 6.26 K/uL (01-27-22 @ 10:27)    Creatinine, Serum: 0.42 mg/dL (01-31-22 @ 07:09)  Creatinine, Serum: 0.46 mg/dL (01-30-22 @ 09:39)  Creatinine, Serum: 0.48 mg/dL (01-29-22 @ 09:10)  Creatinine, Serum: 0.45 mg/dL (01-28-22 @ 08:09)  Creatinine, Serum: 0.60 mg/dL (01-27-22 @ 08:30)    C-Reactive Protein, Serum: 12 mg/L (01-29-22 @ 09:10)  C-Reactive Protein, Serum: 24 mg/L (01-27-22 @ 08:30)  C-Reactive Protein, Serum: 70 mg/L (01-25-22 @ 09:10)    Ferritin, Serum: 242 ng/mL (01-29-22 @ 09:10)  Ferritin, Serum: 230 ng/mL (01-27-22 @ 08:30)  Ferritin, Serum: 280 ng/mL (01-25-22 @ 09:10)    Procalcitonin, Serum: 0.05 ng/mL (01-27-22 @ 13:37)     SARS-CoV-2: Detected (01-23-22 @ 18:44)      
[facility] Division of Hospital Medicine    Chief Complaint: SOB, COVID     SUBJECTIVE / OVERNIGHT EVENTS:   Pt reports she feels well, tired after working with PT earlier     Patient denies chest pain, SOB, abd pain, N/V, fever, chills, dysuria or any other complaints. All remainder ROS negative.     MEDICATIONS  (STANDING):  dexAMETHasone  Injectable 6 milliGRAM(s) IV Push daily  diltiazem    milliGRAM(s) Oral daily  enoxaparin Injectable 40 milliGRAM(s) SubCutaneous daily  levothyroxine 50 MICROGram(s) Oral daily  remdesivir  IVPB   IV Intermittent   remdesivir  IVPB 100 milliGRAM(s) IV Intermittent every 24 hours  tamsulosin 0.4 milliGRAM(s) Oral at bedtime    MEDICATIONS  (PRN):  acetaminophen     Tablet .. 650 milliGRAM(s) Oral every 4 hours PRN Temp greater or equal to 38.5C (101.3F)  ALBUTerol    90 MICROgram(s) HFA Inhaler 2 Puff(s) Inhalation every 4 hours PRN Shortness of Breath and/or Wheezing  benzonatate 100 milliGRAM(s) Oral three times a day PRN Cough  guaifenesin/dextromethorphan Oral Liquid 10 milliLiter(s) Oral every 4 hours PRN Cough        I&O's Summary    2022 07:01  -  2022 07:00  --------------------------------------------------------  IN: 0 mL / OUT: 525 mL / NET: -525 mL        PHYSICAL EXAM:  Vital Signs Last 24 Hrs  T(C): 36.6 (2022 11:13), Max: 36.6 (2022 11:13)  T(F): 97.9 (2022 11:13), Max: 97.9 (2022 11:13)  HR: 76 (2022 11:13) (72 - 83)  BP: 110/68 (2022 11:13) (110/68 - 121/73)  BP(mean): --  RR: 20 (2022 11:13) (19 - 22)  SpO2: 98% (2022 11:13) (90% - 98%)      CONSTITUTIONAL: NAD, sitting up in bed  ENMT: Dry oral mucosa, very [place], on NC   RESPIRATORY: Normal respiratory effort; lungs with reduced air entry bilaterally  CARDIOVASCULAR: Regular rate and rhythm, normal S1 and S2, No lower extremity edema  ABDOMEN: Nontender to palpation, normoactive bowel sounds  MUSCULOSKELETAL: No clubbing or cyanosis of digits   PSYCH: A+O to person, place, not time; affect appropriate  NEUROLOGY: No gross sensory or motor deficits   SKIN: No rashes       LABS:                        14.0   4.54  )-----------( 413      ( 2022 09:10 )             43.3         142  |  101  |  41.8<H>  ----------------------------<  159<H>  3.8   |  29.0  |  0.76    Ca    8.6      2022 09:10    TPro  6.2<L>  /  Alb  2.8<L>  /  TBili  0.3<L>  /  DBili  x   /  AST  35<H>  /  ALT  29  /  AlkPhos  79  -    PT/INR - ( 2022 18:42 )   PT: 12.7 sec;   INR: 1.10 ratio         PTT - ( 2022 18:42 )  PTT:31.2 sec  CARDIAC MARKERS ( 2022 18:42 )  x     / <0.01 ng/mL / x     / x     / x          Urinalysis Basic - ( 2022 21:07 )    Color: Yellow / Appearance: Clear / S.015 / pH: x  Gluc: x / Ketone: Negative  / Bili: Negative / Urobili: 1 mg/dL   Blood: x / Protein: 30 mg/dL / Nitrite: Negative   Leuk Esterase: Negative / RBC: 0-2 /HPF / WBC 0-2 /HPF   Sq Epi: x / Non Sq Epi: Occasional / Bacteria: x    
[facility] Division of Hospital Medicine     Chief Complaint: SOB, COVID     SUBJECTIVE / OVERNIGHT EVENTS:   Pt reports she feels OK, has no complaints     Patient denies chest pain, SOB, abd pain, N/V, fever, chills, dysuria or any other complaints. All remainder ROS negative.     MEDICATIONS  (STANDING):  dexAMETHasone  Injectable 6 milliGRAM(s) IV Push daily  diltiazem    milliGRAM(s) Oral daily  enoxaparin Injectable 40 milliGRAM(s) SubCutaneous daily  levothyroxine 50 MICROGram(s) Oral daily  remdesivir  IVPB   IV Intermittent     MEDICATIONS  (PRN):  acetaminophen     Tablet .. 650 milliGRAM(s) Oral every 4 hours PRN Temp greater or equal to 38.5C (101.3F)  ALBUTerol    90 MICROgram(s) HFA Inhaler 2 Puff(s) Inhalation every 4 hours PRN Shortness of Breath and/or Wheezing  benzonatate 100 milliGRAM(s) Oral three times a day PRN Cough  guaifenesin/dextromethorphan Oral Liquid 10 milliLiter(s) Oral every 4 hours PRN Cough        I&O's Summary      PHYSICAL EXAM:  Vital Signs Last 24 Hrs  T(C): 36.6 (24 Jan 2022 08:34), Max: 37 (23 Jan 2022 21:03)  T(F): 97.8 (24 Jan 2022 08:34), Max: 98.6 (23 Jan 2022 21:03)  HR: 70 (24 Jan 2022 08:34) (63 - 87)  BP: 139/76 (24 Jan 2022 08:34) (139/76 - 159/84)  BP(mean): --  RR: 20 (24 Jan 2022 06:23) (20 - 24)  SpO2: 93% (24 Jan 2022 08:34) (93% - 98%)        CONSTITUTIONAL: NAD, sitting up in bed  ENMT: Dry oral mucosa, very [place], on NC   RESPIRATORY: Normal respiratory effort; lungs with reduced air entry bilaterally  CARDIOVASCULAR: Regular rate and rhythm, normal S1 and S2, No lower extremity edema  ABDOMEN: Nontender to palpation, normoactive bowel sounds  MUSCULOSKELETAL: No clubbing or cyanosis of digits   PSYCH: A+O to person, place, not time; affect appropriate  NEUROLOGY: No gross sensory or motor deficits   SKIN: No rashes       LABS:                        14.2   2.41  )-----------( 373      ( 24 Jan 2022 10:23 )             44.8     01-24    141  |  99  |  20.5<H>  ----------------------------<  157<H>  3.7   |  28.0  |  0.58    Ca    8.4<L>      24 Jan 2022 10:23    TPro  6.1<L>  /  Alb  2.9<L>  /  TBili  0.3<L>  /  DBili  x   /  AST  43<H>  /  ALT  24  /  AlkPhos  79  01-23    PT/INR - ( 23 Jan 2022 18:42 )   PT: 12.7 sec;   INR: 1.10 ratio         PTT - ( 23 Jan 2022 18:42 )  PTT:31.2 sec  CARDIAC MARKERS ( 23 Jan 2022 18:42 )  x     / <0.01 ng/mL / x     / x     / x        
Pneumonia due to organism    HPI:  89 y/o female with PMH of AFIB (no longer on Xarelto due to risk of fall), hypothyroid came to the ED for shortness of breath. Patient is very hard of hearing, HPI obtained from son (Kobi via phone). As per son, patient has not been in her normal state (which is very agile, A/O x3, able to do ADLs) in the last 4 days. She has shortness of breath, fever, cough, HA; family have been trying to manage her but today, she was hypoxic to 83% on RA which prompted ED visit. Of note, son said his sister who the patient lives with had covid 2 weeks ago. Patient was not checked for covid, and she is not vaccinated against covid. No nausea, vomiting, abdominal pain, change in bowel/urinary habit, chest pain, fall reported. (23 Jan 2022 20:42)    Interval History:  Patient was seen and examined at bedside around 8:45 am.   Woke her up from sleep.  Asking for Ice Cream.   Says "I feel OK".   Denies shortness of breath, chest pain, palpitations or cough.     ROS:  As per interval history otherwise unremarkable.    PHYSICAL EXAM:  Vital Signs   T(C): 36.3 (01 Feb 2022 10:44), Max: 36.5 (31 Jan 2022 16:44)  T(F): 97.4 (01 Feb 2022 10:44), Max: 97.7 (31 Jan 2022 16:44)  HR: 62 (01 Feb 2022 10:44) (55 - 75)  BP: 135/75 (01 Feb 2022 10:44) (132/75 - 156/79)  RR: 18 (01 Feb 2022 10:44) (18 - 18)  SpO2: 92% (01 Feb 2022 10:44) (92% - 99%)  General: Elderly female lying in bed comfortably. No acute distress. Emaciated.   HEENT: EOMI. Clear conjunctivae. Moist mucus membrane  Neck: Supple.   Chest: Good air entry. No wheezing, rales or rhonchi.   Heart: Normal S1 & S2. RRR.   Abdomen: Soft. Non-tender. Non-distended. + BS  Ext: No pedal edema. No calf tenderness. Swollen. No erythema or warmth.    Neuro: Woke her up from sleep. Moves all four extremities. No speech disorder  Skin: Warm and Dry  Psychiatry: Normal mood and affect    LABS:  CAPILLARY BLOOD GLUCOSE  POCT Blood Glucose.: 136 mg/dL (01 Feb 2022 11:47)  POCT Blood Glucose.: 155 mg/dL (01 Feb 2022 09:08)  POCT Blood Glucose.: 184 mg/dL (31 Jan 2022 21:16)  POCT Blood Glucose.: 245 mg/dL (31 Jan 2022 16:42)  POCT Blood Glucose.: 230 mg/dL (31 Jan 2022 12:18)                      16.4   8.29  )-----------( Clumped    ( 31 Jan 2022 07:09 )             49.5     02-01    142  |  105  |  25.9<H>  ----------------------------<  137<H>  3.8   |  25.0  |  0.46<L>    Ca    7.9<L>      01 Feb 2022 07:32    TPro  4.8<L>  /  Alb  2.5<L>  /  TBili  0.5  /  DBili  x   /  AST  12  /  ALT  15  /  AlkPhos  65  02-01    RADIOLOGY & ADDITIONAL STUDIES:  Reviewed     MEDICATIONS  (STANDING):  dexAMETHasone  Injectable 6 milliGRAM(s) IV Push daily  dextrose 40% Gel 15 Gram(s) Oral once  dextrose 5%. 1000 milliLiter(s) (50 mL/Hr) IV Continuous <Continuous>  dextrose 5%. 1000 milliLiter(s) (100 mL/Hr) IV Continuous <Continuous>  dextrose 50% Injectable 25 Gram(s) IV Push once  dextrose 50% Injectable 12.5 Gram(s) IV Push once  dextrose 50% Injectable 25 Gram(s) IV Push once  diltiazem    Tablet 60 milliGRAM(s) Oral two times a day  enoxaparin Injectable 40 milliGRAM(s) SubCutaneous daily  FIRST- Mouthwash  BLM 5 milliLiter(s) Swish and Spit two times a day  glucagon  Injectable 1 milliGRAM(s) IntraMuscular once  insulin lispro (ADMELOG) corrective regimen sliding scale   SubCutaneous three times a day before meals  insulin lispro (ADMELOG) corrective regimen sliding scale   SubCutaneous at bedtime  lactated ringers. 1000 milliLiter(s) (75 mL/Hr) IV Continuous <Continuous>  levothyroxine 50 MICROGram(s) Oral daily  multivitamin 1 Tablet(s) Oral daily  pantoprazole  Injectable 40 milliGRAM(s) IV Push daily  potassium chloride   Powder 40 milliEquivalent(s) Oral once  remdesivir  IVPB 100 milliGRAM(s) IV Intermittent every 24 hours  tamsulosin 0.4 milliGRAM(s) Oral at bedtime    MEDICATIONS  (PRN):  acetaminophen     Tablet .. 650 milliGRAM(s) Oral every 4 hours PRN Temp greater or equal to 38.5C (101.3F)  ALBUTerol    90 MICROgram(s) HFA Inhaler 2 Puff(s) Inhalation every 4 hours PRN Shortness of Breath and/or Wheezing  benzonatate 100 milliGRAM(s) Oral three times a day PRN Cough  guaifenesin/dextromethorphan Oral Liquid 10 milliLiter(s) Oral every 4 hours PRN Cough                
Pneumonia due to organism    HPI:  89 y/o female with PMH of AFIB (no longer on Xarelto due to risk of fall), hypothyroid came to the ED for shortness of breath. Patient is very hard of hearing, HPI obtained from son (Kobi via phone). As per son, patient has not been in her normal state (which is very agile, A/O x3, able to do ADLs) in the last 4 days. She has shortness of breath, fever, cough, HA; family have been trying to manage her but today, she was hypoxic to 83% on RA which prompted ED visit. Of note, son said his sister who the patient lives with had covid 2 weeks ago. Patient was not checked for covid, and she is not vaccinated against covid. No nausea, vomiting, abdominal pain, change in bowel/urinary habit, chest pain, fall reported. (23 Jan 2022 20:42)    Interval History:  Patient was seen and examined at bedside around 9:30 am.   Woke her up from sleep.  Breathing is improving.   No active complaints.     ROS:  As per interval history otherwise unremarkable.    PHYSICAL EXAM:  Vital Signs   T(C): 36.7 (29 Jan 2022 10:20), Max: 36.7 (28 Jan 2022 16:00)  T(F): 98 (29 Jan 2022 10:20), Max: 98.1 (28 Jan 2022 16:00)  HR: 59 (29 Jan 2022 10:20) (47 - 66)  BP: 128/69 (29 Jan 2022 10:20) (122/76 - 147/82)  RR: 20 (29 Jan 2022 10:20) (18 - 20)  SpO2: 92% (29 Jan 2022 10:20) (92% - 97%)  General: Elderly female lying in bed comfortably. No acute distress. Emaciated.   HEENT: EOMI. Clear conjunctivae. Moist mucus membrane  Neck: Supple.   Chest: Good air entry. No wheezing, rales or rhonchi.   Heart: Normal S1 & S2. RRR.   Abdomen: Soft. Non-tender. Non-distended. + BS  Ext: No pedal edema. No calf tenderness   Neuro: Woke her up from sleep. Moves all four extremities. No speech disorder  Skin: Warm and Dry  Psychiatry: Normal mood and affect    LABS:                        14.1   6.74  )-----------( 160      ( 29 Jan 2022 09:10 )             44.3     01-29    143  |  106  |  24.3<H>  ----------------------------<  138<H>  3.6   |  25.0  |  0.48<L>    Ca    8.1<L>      29 Jan 2022 09:10    TPro  5.1<L>  /  Alb  2.4<L>  /  TBili  0.4  /  DBili  x   /  AST  15  /  ALT  17  /  AlkPhos  67  01-29    RADIOLOGY & ADDITIONAL STUDIES:  Reviewed     MEDICATIONS  (STANDING):  dexAMETHasone  Injectable 6 milliGRAM(s) IV Push daily  dextrose 40% Gel 15 Gram(s) Oral once  dextrose 5%. 1000 milliLiter(s) (50 mL/Hr) IV Continuous <Continuous>  dextrose 5%. 1000 milliLiter(s) (100 mL/Hr) IV Continuous <Continuous>  dextrose 50% Injectable 25 Gram(s) IV Push once  dextrose 50% Injectable 12.5 Gram(s) IV Push once  dextrose 50% Injectable 25 Gram(s) IV Push once  diltiazem    milliGRAM(s) Oral daily  enoxaparin Injectable 40 milliGRAM(s) SubCutaneous daily  FIRST- Mouthwash  BLM 5 milliLiter(s) Swish and Spit two times a day  glucagon  Injectable 1 milliGRAM(s) IntraMuscular once  insulin lispro (ADMELOG) corrective regimen sliding scale   SubCutaneous three times a day before meals  insulin lispro (ADMELOG) corrective regimen sliding scale   SubCutaneous at bedtime  lactated ringers. 1000 milliLiter(s) (100 mL/Hr) IV Continuous <Continuous>  levothyroxine 50 MICROGram(s) Oral daily  multivitamin 1 Tablet(s) Oral daily  pantoprazole  Injectable 40 milliGRAM(s) IV Push daily  remdesivir  IVPB 100 milliGRAM(s) IV Intermittent every 24 hours  tamsulosin 0.4 milliGRAM(s) Oral at bedtime    MEDICATIONS  (PRN):  acetaminophen     Tablet .. 650 milliGRAM(s) Oral every 4 hours PRN Temp greater or equal to 38.5C (101.3F)  ALBUTerol    90 MICROgram(s) HFA Inhaler 2 Puff(s) Inhalation every 4 hours PRN Shortness of Breath and/or Wheezing  benzonatate 100 milliGRAM(s) Oral three times a day PRN Cough  guaifenesin/dextromethorphan Oral Liquid 10 milliLiter(s) Oral every 4 hours PRN Cough

## 2022-02-05 NOTE — PROGRESS NOTE ADULT - ASSESSMENT
89 years old female with history of Chronic A. Fib (no longer on Xarelto due to fall risk) and Hypothyroidism presented with shortness of breath. + COVID exposure.     1) Acute Respiratory Failure with Hypoxia  - secondary to COVID-19 Pneumonia  - Finished Remdesivir (extended course) on 2/1/22  - Finished Dexamethasone 6 mg IVP daily   - Tylenol, Albuterol and Robitussin PRN  - Continue supplemental oxygen via NC, wean off as tolerated, requiring 2-4L    - Not cooperating for prone position   - Incentive spirometry  - ID follow up noted    2) Chronic A. Fib  - Continue Cardizem  - Not on AC due to fall risk    3) Prediabetes   - HbA1c 6.3  - Accu checks and ISS    4) Hypothyroidism  - Continue Synthroid    5) Dysphagia  - No acute findings on CT Head   - ST following, recommended -- Easy to chew solids, mildly thick fluids.    6) Hypernatremia  - Likely due to dehydration  - Improved with IVF    7) Severe Protein Calorie Malnutrition  - Nutrition Eval noted  - Added MVI, Glucerna and Protein Gelatin     DVT Prophylaxis -- Lovenox SQ    GOC: DNR/I. Prognosis guarded.     Dispo: [facility] today.     Discussed with SW/CM.

## 2022-02-05 NOTE — DISCHARGE NOTE NURSING/CASE MANAGEMENT/SOCIAL WORK - PATIENT PORTAL LINK FT
You can access the FollowMyHealth Patient Portal offered by [facility] by registering at the following website: [email or web address]. By joining [facility]’s FollowMyHealth portal, you will also be able to view your health information using other applications (apps) compatible with our system.

## 2022-02-05 NOTE — DISCHARGE NOTE NURSING/CASE MANAGEMENT/SOCIAL WORK - NSDCPEFALRISK_GEN_ALL_CORE
For information on Fall & Injury Prevention, visit: [email or web address] OR  [email or web address] OR  https://www.cdc.gov/steadi/patient.html

## 2022-05-31 NOTE — ED PROVIDER NOTE - MEDICAL DECISION MAKING DETAILS
Dizziness.  Cardiac enzymes and CT head negative. Dizziness has since resolved. will d/c home with follow up with PCP details…

## 2023-09-11 NOTE — ED PROVIDER NOTE - NS ED MD DISPO SPECIAL CONSIDERATION1
None Prednisone Pregnancy And Lactation Text: This medication is Pregnancy Category C and it isn't know if it is safe during pregnancy. This medication is excreted in breast milk.

## 2024-04-09 NOTE — H&P ADULT - NSHPPHYSICALEXAM_GEN_ALL_CORE
Detail Level: Detailed
Quality 130: Documentation Of Current Medications In The Medical Record: Current Medications Documented
Quality 226: Preventive Care And Screening: Tobacco Use: Screening And Cessation Intervention: Patient screened for tobacco use and is an ex/non-smoker
Vital Signs Last 24 Hrs  T(C): 36.5 (23 Jan 2022 17:17), Max: 36.5 (23 Jan 2022 17:17)  T(F): 97.7 (23 Jan 2022 17:17), Max: 97.7 (23 Jan 2022 17:17)  HR: 87 (23 Jan 2022 17:17) (87 - 87)  BP: 152/78 (23 Jan 2022 17:17) (152/78 - 152/78)  BP(mean): --  RR: 22 (23 Jan 2022 17:17) (22 - 22)  SpO2: 96% (23 Jan 2022 17:17) (96% - 96%)

## 2024-04-25 NOTE — ED ADULT TRIAGE NOTE - SOURCE OF INFORMATION
[FreeTextEntry1] : Physical Exam: Constitutional: No acute distress, well appearing HEENT: Normocephalic, atraumatic Neck: supple Cardiac: Regular rate and rhythm, No murmurs Pulmonary: No respiratory distress, Lungs clear to auscultation bilaterally, no wheezing, rales, or rhonchi Abdomen: Soft, non-tender, non-distended, no guarding, normal bowel sounds Vascular: No peripheral edema Neurology: Coordination grossly intact, no focal deficits Psychiatric: Alert and oriented x3, normal mood       A/P: HTN: on lisinopril 10mg and amlodipine 10mg daily normotensive, well controlled - rec she continue with current meds - f/u 3 months for CPE - we discussed again that she is recommended to see cardiology as she has multiple cardiovascular risk factors including obesity, HTN, HLD, preDM, obesity, and family hx cardiac disease.  - salt and caffeine reduction  anxiety: on lexapro 10mg daily, prescribed by her psychiatrist. - c/w lexapro as directed by psych  preDM; last a1c improvement 5.9 1/2024 - c/w lifestyle modif including healthy dieting and exercise. rec to cut down carb use and weight loss - will continue to monitor  HLD; lipid improvement, october labs - rec low cholesterol diet and continued weight loss - c/w atorvastatin 40mg daily - will get updated fasting lipids, she will go to the lab  BMI 47 follows with weight loss clinic she is frustrated she has gained more weight states she developed muscle contractions/ tics while on naltrexone for weight loss and so discontinued meds were changed to phentermine and topiramate but she has read about potential side effects and she is unsure if she wants to take she was recommended surgery but does not want - rec she see cardiology ideally before starting above meds - have recommended she see endo for further more in deph evaluation  - will check labs including cortisol levels, rec she go to the lab between 6-9am for this fasting bw - pt agreeable with all of the above   lower back pain worsening heating pads help.   worse when she is active such as at work, when she is standing and walking  she feels like bones are cracking.  intense dull pain eases up when she sits denies incontinence, weakness, numbness.  neg SLR/ PREETI/ ESTER stated some TTP on deep palpation of lower back/ sacral area of note, she has gained further weight - will get XR - rec she see specialist, have referred to physiatry Patient

## 2024-04-30 NOTE — ED PROVIDER NOTE - NS_EDPROVIDERDISPOUSERTYPE_ED_A_ED
Attending Attestation (For Attendings USE Only)... Nsaids Pregnancy And Lactation Text: These medications are considered safe up to 30 weeks gestation. It is excreted in breast milk. Propranolol Counseling:  I discussed with the patient the risks of propranolol including but not limited to low heart rate, low blood pressure, low blood sugar, restlessness and increased cold sensitivity. They should call the office if they experience any of these side effects. Cephalexin Pregnancy And Lactation Text: This medication is Pregnancy Category B and considered safe during pregnancy.  It is also excreted in breast milk but can be used safely for shorter doses. Birth Control Pills Counseling: Birth Control Pill Counseling: I discussed with the patient the potential side effects of OCPs including but not limited to increased risk of stroke, heart attack, thrombophlebitis, deep venous thrombosis, hepatic adenomas, breast changes, GI upset, headaches, and depression.  The patient verbalized understanding of the proper use and possible adverse effects of OCPs. All of the patient's questions and concerns were addressed. Doxepin Pregnancy And Lactation Text: This medication is Pregnancy Category C and it isn't known if it is safe during pregnancy. It is also excreted in breast milk and breast feeding isn't recommended. Odomzo Counseling- I discussed with the patient the risks of Odomzo including but not limited to nausea, vomiting, diarrhea, constipation, weight loss, changes in the sense of taste, decreased appetite, muscle spasms, and hair loss.  The patient verbalized understanding of the proper use and possible adverse effects of Odomzo.  All of the patient's questions and concerns were addressed. Cibinqo Pregnancy And Lactation Text: It is unknown if this medication will adversely affect pregnancy or breast feeding.  You should not take this medication if you are currently pregnant or planning a pregnancy or while breastfeeding. Adbry Pregnancy And Lactation Text: It is unknown if this medication will adversely affect pregnancy or breast feeding. Wartpeel Pregnancy And Lactation Text: This medication is Pregnancy Category X and contraindicated in pregnancy and in women who may become pregnant. It is unknown if this medication is excreted in breast milk. Topical Ketoconazole Counseling: Patient counseled that this medication may cause skin irritation or allergic reactions.  In the event of skin irritation, the patient was advised to reduce the amount of the drug applied or use it less frequently.   The patient verbalized understanding of the proper use and possible adverse effects of ketoconazole.  All of the patient's questions and concerns were addressed. Itraconazole Pregnancy And Lactation Text: This medication is Pregnancy Category C and it isn't know if it is safe during pregnancy. It is also excreted in breast milk. Solaraze Pregnancy And Lactation Text: This medication is Pregnancy Category B and is considered safe. There is some data to suggest avoiding during the third trimester. It is unknown if this medication is excreted in breast milk. Isotretinoin Pregnancy And Lactation Text: This medication is Pregnancy Category X and is considered extremely dangerous during pregnancy. It is unknown if it is excreted in breast milk. Picato Counseling:  I discussed with the patient the risks of Picato including but not limited to erythema, scaling, itching, weeping, crusting, and pain. Enbrel Counseling:  I discussed with the patient the risks of etanercept including but not limited to myelosuppression, immunosuppression, autoimmune hepatitis, demyelinating diseases, lymphoma, and infections.  The patient understands that monitoring is required including a PPD at baseline and must alert us or the primary physician if symptoms of infection or other concerning signs are noted. Xeljanz Counseling: I discussed with the patient the risks of Xeljanz therapy including increased risk of infection, liver issues, headache, diarrhea, or cold symptoms. Live vaccines should be avoided. They were instructed to call if they have any problems. Metronidazole Counseling:  I discussed with the patient the risks of metronidazole including but not limited to seizures, nausea/vomiting, a metallic taste in the mouth, nausea/vomiting and severe allergy. Rituxan Pregnancy And Lactation Text: This medication is Pregnancy Category C and it isn't know if it is safe during pregnancy. It is unknown if this medication is excreted in breast milk but similar antibodies are known to be excreted. Imiquimod Pregnancy And Lactation Text: This medication is Pregnancy Category C. It is unknown if this medication is excreted in breast milk. Tremfya Counseling: I discussed with the patient the risks of guselkumab including but not limited to immunosuppression, serious infections, worsening of inflammatory bowel disease and drug reactions.  The patient understands that monitoring is required including a PPD at baseline and must alert us or the primary physician if symptoms of infection or other concerning signs are noted. Cyclophosphamide Pregnancy And Lactation Text: This medication is Pregnancy Category D and it isn't considered safe during pregnancy. This medication is excreted in breast milk. Olanzapine Counseling- I discussed with the patient the common side effects of olanzapine including but are not limited to: lack of energy, dry mouth, increased appetite, sleepiness, tremor, constipation, dizziness, changes in behavior, or restlessness.  Explained that teenagers are more likely to experience headaches, abdominal pain, pain in the arms or legs, tiredness, and sleepiness.  Serious side effects include but are not limited: increased risk of death in elderly patients who are confused, have memory loss, or dementia-related psychosis; hyperglycemia; increased cholesterol and triglycerides; and weight gain. Drysol Counseling:  I discussed with the patient the risks of drysol/aluminum chloride including but not limited to skin rash, itching, irritation, burning. Propranolol Pregnancy And Lactation Text: This medication is Pregnancy Category C and it isn't known if it is safe during pregnancy. It is excreted in breast milk. Sarecycline Pregnancy And Lactation Text: This medication is Pregnancy Category D and not consider safe during pregnancy. It is also excreted in breast milk. Use Enhanced Medication Counseling?: No Glycopyrrolate Pregnancy And Lactation Text: This medication is Pregnancy Category B and is considered safe during pregnancy. It is unknown if it is excreted breast milk. Birth Control Pills Pregnancy And Lactation Text: This medication should be avoided if pregnant and for the first 30 days post-partum. Opioid Counseling: I discussed with the patient the potential side effects of opioids including but not limited to addiction, altered mental status, and depression. I stressed avoiding alcohol, benzodiazepines, muscle relaxants and sleep aids unless specifically okayed by a physician. The patient verbalized understanding of the proper use and possible adverse effects of opioids. All of the patient's questions and concerns were addressed. They were instructed to flush the remaining pills down the toilet if they did not need them for pain. Arava Counseling:  Patient counseled regarding adverse effects of Arava including but not limited to nausea, vomiting, abnormalities in liver function tests. Patients may develop mouth sores, rash, diarrhea, and abnormalities in blood counts. The patient understands that monitoring is required including LFTs and blood counts.  There is a rare possibility of scarring of the liver and lung problems that can occur when taking methotrexate. Persistent nausea, loss of appetite, pale stools, dark urine, cough, and shortness of breath should be reported immediately. Patient advised to discontinue Arava treatment and consult with a physician prior to attempting conception. The patient will have to undergo a treatment to eliminate Arava from the body prior to conception. Detail Level: Zone Clindamycin Counseling: I counseled the patient regarding use of clindamycin as an antibiotic for prophylactic and/or therapeutic purposes. Clindamycin is active against numerous classes of bacteria, including skin bacteria. Side effects may include nausea, diarrhea, gastrointestinal upset, rash, hives, yeast infections, and in rare cases, colitis. Hydroxyzine Counseling: Patient advised that the medication is sedating and not to drive a car after taking this medication.  Patient informed of potential adverse effects including but not limited to dry mouth, urinary retention, and blurry vision.  The patient verbalized understanding of the proper use and possible adverse effects of hydroxyzine.  All of the patient's questions and concerns were addressed. Enbrel Pregnancy And Lactation Text: This medication is Pregnancy Category B and is considered safe during pregnancy. It is unknown if this medication is excreted in breast milk. Odomzo Pregnancy And Lactation Text: This medication is Pregnancy Category X and is absolutely contraindicated during pregnancy. It is unknown if it is excreted in breast milk. Bimzelx Counseling:  I discussed with the patient the risks of Bimzelx including but not limited to depression, immunosuppression, allergic reactions and infections.  The patient understands that monitoring is required including a PPD at baseline and must alert us or the primary physician if symptoms of infection or other concerning signs are noted. Litfulo Counseling: I discussed with the patient the risks of Litfulo therapy including but not limited to upper respiratory tract infections, shingles, cold sores, and nausea. Live vaccines should be avoided.  This medication has been linked to serious infections; higher rate of mortality; malignancy and lymphoproliferative disorders; major adverse cardiovascular events; thrombosis; gastrointestinal perforations; neutropenia; lymphopenia; anemia; liver enzyme elevations; and lipid elevations. Winlevi Counseling:  I discussed with the patient the risks of topical clascoterone including but not limited to erythema, scaling, itching, and stinging. Patient voiced their understanding. Benzoyl Peroxide Counseling: Patient counseled that medicine may cause skin irritation and bleach clothing.  In the event of skin irritation, the patient was advised to reduce the amount of the drug applied or use it less frequently.   The patient verbalized understanding of the proper use and possible adverse effects of benzoyl peroxide.  All of the patient's questions and concerns were addressed. Xeljasz Pregnancy And Lactation Text: This medication is Pregnancy Category D and is not considered safe during pregnancy.  The risk during breast feeding is also uncertain. Topical Ketoconazole Pregnancy And Lactation Text: This medication is Pregnancy Category B and is considered safe during pregnancy. It is unknown if it is excreted in breast milk. Metronidazole Pregnancy And Lactation Text: This medication is Pregnancy Category B and considered safe during pregnancy.  It is also excreted in breast milk. Tremfya Pregnancy And Lactation Text: The risk during pregnancy and breastfeeding is uncertain with this medication. Soolantra Counseling: I discussed with the patients the risks of topial Soolantra. This is a medicine which decreases the number of mites and inflammation in the skin. You experience burning, stinging, eye irritation or allergic reactions.  Please call our office if you develop any problems from using this medication. Ketoconazole Counseling:   Patient counseled regarding improving absorption with orange juice.  Adverse effects include but are not limited to breast enlargement, headache, diarrhea, nausea, upset stomach, liver function test abnormalities, taste disturbance, and stomach pain.  There is a rare possibility of liver failure that can occur when taking ketoconazole. The patient understands that monitoring of LFTs may be required, especially at baseline. The patient verbalized understanding of the proper use and possible adverse effects of ketoconazole.  All of the patient's questions and concerns were addressed. Cyclosporine Counseling:  I discussed with the patient the risks of cyclosporine including but not limited to hypertension, gingival hyperplasia,myelosuppression, immunosuppression, liver damage, kidney damage, neurotoxicity, lymphoma, and serious infections. The patient understands that monitoring is required including baseline blood pressure, CBC, CMP, lipid panel and uric acid, and then 1-2 times monthly CMP and blood pressure. Tetracycline Counseling: Patient counseled regarding possible photosensitivity and increased risk for sunburn.  Patient instructed to avoid sunlight, if possible.  When exposed to sunlight, patients should wear protective clothing, sunglasses, and sunscreen.  The patient was instructed to call the office immediately if the following severe adverse effects occur:  hearing changes, easy bruising/bleeding, severe headache, or vision changes.  The patient verbalized understanding of the proper use and possible adverse effects of tetracycline.  All of the patient's questions and concerns were addressed. Patient understands to avoid pregnancy while on therapy due to potential birth defects. Siliq Counseling:  I discussed with the patient the risks of Siliq including but not limited to new or worsening depression, suicidal thoughts and behavior, immunosuppression, malignancy, posterior leukoencephalopathy syndrome, and serious infections.  The patient understands that monitoring is required including a PPD at baseline and must alert us or the primary physician if symptoms of infection or other concerning signs are noted. There is also a special program designed to monitor depression which is required with Siliq. High Dose Vitamin A Counseling: Side effects reviewed, pt to contact office should one occur. SSKI Counseling:  I discussed with the patient the risks of SSKI including but not limited to thyroid abnormalities, metallic taste, GI upset, fever, headache, acne, arthralgias, paraesthesias, lymphadenopathy, easy bleeding, arrhythmias, and allergic reaction. Drysol Pregnancy And Lactation Text: This medication is considered safe during pregnancy and breast feeding. Klisyri Counseling:  I discussed with the patient the risks of Klisyri including but not limited to erythema, scaling, itching, weeping, crusting, and pain. Hydroxychloroquine Counseling:  I discussed with the patient that a baseline ophthalmologic exam is needed at the start of therapy and every year thereafter while on therapy. A CBC may also be warranted for monitoring.  The side effects of this medication were discussed with the patient, including but not limited to agranulocytosis, aplastic anemia, seizures, rashes, retinopathy, and liver toxicity. Patient instructed to call the office should any adverse effect occur.  The patient verbalized understanding of the proper use and possible adverse effects of Plaquenil.  All the patient's questions and concerns were addressed. Olanzapine Pregnancy And Lactation Text: This medication is pregnancy category C.   There are no adequate and well controlled trials with olanzapine in pregnant females.  Olanzapine should be used during pregnancy only if the potential benefit justifies the potential risk to the fetus.   In a study in lactating healthy women, olanzapine was excreted in breast milk.  It is recommended that women taking olanzapine should not breast feed. Clindamycin Pregnancy And Lactation Text: This medication can be used in pregnancy if certain situations. Clindamycin is also present in breast milk. Spironolactone Counseling: Patient advised regarding risks of diarrhea, abdominal pain, hyperkalemia, birth defects (for female patients), liver toxicity and renal toxicity. The patient may need blood work to monitor liver and kidney function and potassium levels while on therapy. The patient verbalized understanding of the proper use and possible adverse effects of spironolactone.  All of the patient's questions and concerns were addressed. Hydroxyzine Pregnancy And Lactation Text: This medication is not safe during pregnancy and should not be taken. It is also excreted in breast milk and breast feeding isn't recommended. Bimzelx Pregnancy And Lactation Text: This medication crosses the placenta and the safety is uncertain during pregnancy. It is unknown if this medication is present in breast milk. Litfulo Pregnancy And Lactation Text: Based on animal studies, Lifulo may cause embryo-fetal harm when administered to pregnant women.  The medication should not be used in pregnancy.  Breastfeeding is not recommended during treatment. Winlevi Pregnancy And Lactation Text: This medication is considered safe during pregnancy and breastfeeding. Humira Counseling:  I discussed with the patient the risks of adalimumab including but not limited to myelosuppression, immunosuppression, autoimmune hepatitis, demyelinating diseases, lymphoma, and serious infections.  The patient understands that monitoring is required including a PPD at baseline and must alert us or the primary physician if symptoms of infection or other concerning signs are noted. Klisyri Pregnancy And Lactation Text: It is unknown if this medication can harm a developing fetus or if it is excreted in breast milk. Topical Metronidazole Counseling: Metronidazole is a topical antibiotic medication. You may experience burning, stinging, redness, or allergic reactions.  Please call our office if you develop any problems from using this medication. Ketoconazole Pregnancy And Lactation Text: This medication is Pregnancy Category C and it isn't know if it is safe during pregnancy. It is also excreted in breast milk and breast feeding isn't recommended. Benzoyl Peroxide Pregnancy And Lactation Text: This medication is Pregnancy Category C. It is unknown if benzoyl peroxide is excreted in breast milk. Soolantra Pregnancy And Lactation Text: This medication is Pregnancy Category C. This medication is considered safe during breast feeding. High Dose Vitamin A Pregnancy And Lactation Text: High dose vitamin A therapy is contraindicated during pregnancy and breast feeding. Minocycline Counseling: Patient advised regarding possible photosensitivity and discoloration of the teeth, skin, lips, tongue and gums.  Patient instructed to avoid sunlight, if possible.  When exposed to sunlight, patients should wear protective clothing, sunglasses, and sunscreen.  The patient was instructed to call the office immediately if the following severe adverse effects occur:  hearing changes, easy bruising/bleeding, severe headache, or vision changes.  The patient verbalized understanding of the proper use and possible adverse effects of minocycline.  All of the patient's questions and concerns were addressed. Xolair Counseling:  Patient informed of potential adverse effects including but not limited to fever, muscle aches, rash and allergic reactions.  The patient verbalized understanding of the proper use and possible adverse effects of Xolair.  All of the patient's questions and concerns were addressed. Protopic Counseling: Patient may experience a mild burning sensation during topical application. Protopic is not approved in children less than 2 years of age. There have been case reports of hematologic and skin malignancies in patients using topical calcineurin inhibitors although causality is questionable. Hydroxychloroquine Pregnancy And Lactation Text: This medication has been shown to cause fetal harm but it isn't assigned a Pregnancy Risk Category. There are small amounts excreted in breast milk. Dutasteride Male Counseling: Dustasteride Counseling:  I discussed with the patient the risks of use of dutasteride including but not limited to decreased libido, decreased ejaculate volume, and gynecomastia. Women who can become pregnant should not handle medication.  All of the patient's questions and concerns were addressed. Elidel Counseling: Patient may experience a mild burning sensation during topical application. Elidel is not approved in children less than 2 years of age. There have been case reports of hematologic and skin malignancies in patients using topical calcineurin inhibitors although causality is questionable. Cyclosporine Pregnancy And Lactation Text: This medication is Pregnancy Category C and it isn't know if it is safe during pregnancy. This medication is excreted in breast milk. Oral Minoxidil Counseling- I discussed with the patient the risks of oral minoxidil including but not limited to shortness of breath, swelling of the feet or ankles, dizziness, lightheadedness, unwanted hair growth and allergic reaction.  The patient verbalized understanding of the proper use and possible adverse effects of oral minoxidil.  All of the patient's questions and concerns were addressed. Sski Pregnancy And Lactation Text: This medication is Pregnancy Category D and isn't considered safe during pregnancy. It is excreted in breast milk. Spironolactone Pregnancy And Lactation Text: This medication can cause feminization of the male fetus and should be avoided during pregnancy. The active metabolite is also found in breast milk. Clofazimine Counseling:  I discussed with the patient the risks of clofazimine including but not limited to skin and eye pigmentation, liver damage, nausea/vomiting, gastrointestinal bleeding and allergy. Terbinafine Counseling: Patient counseling regarding adverse effects of terbinafine including but not limited to headache, diarrhea, rash, upset stomach, liver function test abnormalities, itching, taste/smell disturbance, nausea, abdominal pain, and flatulence.  There is a rare possibility of liver failure that can occur when taking terbinafine.  The patient understands that a baseline LFT and kidney function test may be required. The patient verbalized understanding of the proper use and possible adverse effects of terbinafine.  All of the patient's questions and concerns were addressed. Topical Metronidazole Pregnancy And Lactation Text: This medication is Pregnancy Category B and considered safe during pregnancy.  It is also considered safe to use while breastfeeding. Olumiant Counseling: I discussed with the patient the risks of Olumiant therapy including but not limited to upper respiratory tract infections, shingles, cold sores, and nausea. Live vaccines should be avoided.  This medication has been linked to serious infections; higher rate of mortality; malignancy and lymphoproliferative disorders; major adverse cardiovascular events; thrombosis; gastrointestinal perforations; neutropenia; lymphopenia; anemia; liver enzyme elevations; and lipid elevations. Minoxidil Counseling: Minoxidil is a topical medication which can increase blood flow where it is applied. It is uncertain how this medication increases hair growth. Side effects are uncommon and include stinging and allergic reactions. Protopic Pregnancy And Lactation Text: This medication is Pregnancy Category C. It is unknown if this medication is excreted in breast milk when applied topically. VTAMA Counseling: I discussed with the patient that VTAMA is not for use in the eyes, mouth or mouth. They should call the office if they develop any signs of allergic reactions to VTAMA. The patient verbalized understanding of the proper use and possible adverse effects of VTAMA.  All of the patient's questions and concerns were addressed. Cimzia Counseling:  I discussed with the patient the risks of Cimzia including but not limited to immunosuppression, allergic reactions and infections.  The patient understands that monitoring is required including a PPD at baseline and must alert us or the primary physician if symptoms of infection or other concerning signs are noted. Carac Counseling:  I discussed with the patient the risks of Carac including but not limited to erythema, scaling, itching, weeping, crusting, and pain. Xolair Pregnancy And Lactation Text: This medication is Pregnancy Category B and is considered safe during pregnancy. This medication is excreted in breast milk. Albendazole Counseling:  I discussed with the patient the risks of albendazole including but not limited to cytopenia, kidney damage, nausea/vomiting and severe allergy.  The patient understands that this medication is being used in an off-label manner. Topical Retinoid counseling:  Patient advised to apply a pea-sized amount only at bedtime and wait 30 minutes after washing their face before applying.  If too drying, patient may add a non-comedogenic moisturizer. The patient verbalized understanding of the proper use and possible adverse effects of retinoids.  All of the patient's questions and concerns were addressed. Oral Minoxidil Pregnancy And Lactation Text: This medication should only be used when clearly needed if you are pregnant, attempting to become pregnant or breast feeding. Methotrexate Counseling:  Patient counseled regarding adverse effects of methotrexate including but not limited to nausea, vomiting, abnormalities in liver function tests. Patients may develop mouth sores, rash, diarrhea, and abnormalities in blood counts. The patient understands that monitoring is required including LFT's and blood counts.  There is a rare possibility of scarring of the liver and lung problems that can occur when taking methotrexate. Persistent nausea, loss of appetite, pale stools, dark urine, cough, and shortness of breath should be reported immediately. Patient advised to discontinue methotrexate treatment at least three months before attempting to become pregnant.  I discussed the need for folate supplements while taking methotrexate.  These supplements can decrease side effects during methotrexate treatment. The patient verbalized understanding of the proper use and possible adverse effects of methotrexate.  All of the patient's questions and concerns were addressed. Simponi Counseling:  I discussed with the patient the risks of golimumab including but not limited to myelosuppression, immunosuppression, autoimmune hepatitis, demyelinating diseases, lymphoma, and serious infections.  The patient understands that monitoring is required including a PPD at baseline and must alert us or the primary physician if symptoms of infection or other concerning signs are noted. Thalidomide Counseling: I discussed with the patient the risks of thalidomide including but not limited to birth defects, anxiety, weakness, chest pain, dizziness, cough and severe allergy. Dutasteride Female Counseling: Dutasteride Counseling:  I discussed with the patient the risks of use of dutasteride including but not limited to decreased libido and sexual dysfunction. Explained the teratogenic nature of the medication and stressed the importance of not getting pregnant during treatment. All of the patient's questions and concerns were addressed. Low Dose Naltrexone Counseling- I discussed with the patient the potential risks and side effects of low dose naltrexone including but not limited to: more vivid dreams, headaches, nausea, vomiting, abdominal pain, fatigue, dizziness, and anxiety. Azithromycin Counseling:  I discussed with the patient the risks of azithromycin including but not limited to GI upset, allergic reaction, drug rash, diarrhea, and yeast infections. Clofazimine Pregnancy And Lactation Text: This medication is Pregnancy Category C and isn't considered safe during pregnancy. It is excreted in breast milk. Vtama Pregnancy And Lactation Text: It is unknown if this medication can cause problems during pregnancy and breastfeeding. Cimzia Pregnancy And Lactation Text: This medication crosses the placenta but can be considered safe in certain situations. Cimzia may be excreted in breast milk. Topical Steroids Counseling: I discussed with the patient that prolonged use of topical steroids can result in the increased appearance of superficial blood vessels (telangiectasias), lightening (hypopigmentation) and thinning of the skin (atrophy).  Patient understands to avoid using high potency steroids in skin folds, the groin or the face.  The patient verbalized understanding of the proper use and possible adverse effects of topical steroids.  All of the patient's questions and concerns were addressed. Aklief counseling:  Patient advised to apply a pea-sized amount only at bedtime and wait 30 minutes after washing their face before applying.  If too drying, patient may add a non-comedogenic moisturizer.  The most commonly reported side effects including irritation, redness, scaling, dryness, stinging, burning, itching, and increased risk of sunburn.  The patient verbalized understanding of the proper use and possible adverse effects of retinoids.  All of the patient's questions and concerns were addressed. Fluconazole Counseling:  Patient counseled regarding adverse effects of fluconazole including but not limited to headache, diarrhea, nausea, upset stomach, liver function test abnormalities, taste disturbance, and stomach pain.  There is a rare possibility of liver failure that can occur when taking fluconazole.  The patient understands that monitoring of LFTs and kidney function test may be required, especially at baseline. The patient verbalized understanding of the proper use and possible adverse effects of fluconazole.  All of the patient's questions and concerns were addressed. Olumiant Pregnancy And Lactation Text: Based on animal studies, Olumiant may cause embryo-fetal harm when administered to pregnant women.  The medication should not be used in pregnancy.  Breastfeeding is not recommended during treatment. Qbrexza Counseling:  I discussed with the patient the risks of Qbrexza including but not limited to headache, mydriasis, blurred vision, dry eyes, nasal dryness, dry mouth, dry throat, dry skin, urinary hesitation, and constipation.  Local skin reactions including erythema, burning, stinging, and itching can also occur. Terbinafine Pregnancy And Lactation Text: This medication is Pregnancy Category B and is considered safe during pregnancy. It is also excreted in breast milk and breast feeding isn't recommended. Ilumya Counseling: I discussed with the patient the risks of tildrakizumab including but not limited to immunosuppression, malignancy, posterior leukoencephalopathy syndrome, and serious infections.  The patient understands that monitoring is required including a PPD at baseline and must alert us or the primary physician if symptoms of infection or other concerning signs are noted. Minoxidil Pregnancy And Lactation Text: This medication has not been assigned a Pregnancy Risk Category but animal studies failed to show danger with the topical medication. It is unknown if the medication is excreted in breast milk. Methotrexate Pregnancy And Lactation Text: This medication is Pregnancy Category X and is known to cause fetal harm. This medication is excreted in breast milk. Azathioprine Counseling:  I discussed with the patient the risks of azathioprine including but not limited to myelosuppression, immunosuppression, hepatotoxicity, lymphoma, and infections.  The patient understands that monitoring is required including baseline LFTs, Creatinine, possible TPMP genotyping and weekly CBCs for the first month and then every 2 weeks thereafter.  The patient verbalized understanding of the proper use and possible adverse effects of azathioprine.  All of the patient's questions and concerns were addressed. Eucrisa Counseling: Patient may experience a mild burning sensation during topical application. Eucrisa is not approved in children less than 2 years of age. Albendazole Pregnancy And Lactation Text: This medication is Pregnancy Category C and it isn't known if it is safe during pregnancy. It is also excreted in breast milk. Acitretin Counseling:  I discussed with the patient the risks of acitretin including but not limited to hair loss, dry lips/skin/eyes, liver damage, hyperlipidemia, depression/suicidal ideation, photosensitivity.  Serious rare side effects can include but are not limited to pancreatitis, pseudotumor cerebri, bony changes, clot formation/stroke/heart attack.  Patient understands that alcohol is contraindicated since it can result in liver toxicity and significantly prolong the elimination of the drug by many years. Quinolones Counseling:  I discussed with the patient the risks of fluoroquinolones including but not limited to GI upset, allergic reaction, drug rash, diarrhea, dizziness, photosensitivity, yeast infections, liver function test abnormalities, tendonitis/tendon rupture. Dutasteride Pregnancy And Lactation Text: This medication is absolutely contraindicated in women, especially during pregnancy and breast feeding. Feminization of male fetuses is possible if taking while pregnant. Low Dose Naltrexone Pregnancy And Lactation Text: Naltrexone is pregnancy category C.  There have been no adequate and well-controlled studies in pregnant women.  It should be used in pregnancy only if the potential benefit justifies the potential risk to the fetus.   Limited data indicates that naltrexone is minimally excreted into breastmilk. Azithromycin Pregnancy And Lactation Text: This medication is considered safe during pregnancy and is also secreted in breast milk. Otezla Counseling: The side effects of Otezla were discussed with the patient, including but not limited to worsening or new depression, weight loss, diarrhea, nausea, upper respiratory tract infection, and headache. Patient instructed to call the office should any adverse effect occur.  The patient verbalized understanding of the proper use and possible adverse effects of Otezla.  All the patient's questions and concerns were addressed. Aklief Pregnancy And Lactation Text: It is unknown if this medication is safe to use during pregnancy.  It is unknown if this medication is excreted in breast milk.  Breastfeeding women should use the topical cream on the smallest area of the skin for the shortest time needed while breastfeeding.  Do not apply to nipple and areola. Colchicine Counseling:  Patient counseled regarding adverse effects including but not limited to stomach upset (nausea, vomiting, stomach pain, or diarrhea).  Patient instructed to limit alcohol consumption while taking this medication.  Colchicine may reduce blood counts especially with prolonged use.  The patient understands that monitoring of kidney function and blood counts may be required, especially at baseline. The patient verbalized understanding of the proper use and possible adverse effects of colchicine.  All of the patient's questions and concerns were addressed. Erivedge Counseling- I discussed with the patient the risks of Erivedge including but not limited to nausea, vomiting, diarrhea, constipation, weight loss, changes in the sense of taste, decreased appetite, muscle spasms, and hair loss.  The patient verbalized understanding of the proper use and possible adverse effects of Erivedge.  All of the patient's questions and concerns were addressed. Doxycycline Counseling:  Patient counseled regarding possible photosensitivity and increased risk for sunburn.  Patient instructed to avoid sunlight, if possible.  When exposed to sunlight, patients should wear protective clothing, sunglasses, and sunscreen.  The patient was instructed to call the office immediately if the following severe adverse effects occur:  hearing changes, easy bruising/bleeding, severe headache, or vision changes.  The patient verbalized understanding of the proper use and possible adverse effects of doxycycline.  All of the patient's questions and concerns were addressed. Zoryve Counseling:  I discussed with the patient that Zoryve is not for use in the eyes, mouth or vagina. The most commonly reported side effects include diarrhea, headache, insomnia, application site pain, upper respiratory tract infections, and urinary tract infections.  All of the patient's questions and concerns were addressed. Cosentyx Counseling:  I discussed with the patient the risks of Cosentyx including but not limited to worsening of Crohn's disease, immunosuppression, allergic reactions and infections.  The patient understands that monitoring is required including a PPD at baseline and must alert us or the primary physician if symptoms of infection or other concerning signs are noted. Topical Steroids Applications Pregnancy And Lactation Text: Most topical steroids are considered safe to use during pregnancy and lactation.  Any topical steroid applied to the breast or nipple should be washed off before breastfeeding. Stelara Counseling:  I discussed with the patient the risks of ustekinumab including but not limited to immunosuppression, malignancy, posterior leukoencephalopathy syndrome, and serious infections.  The patient understands that monitoring is required including a PPD at baseline and must alert us or the primary physician if symptoms of infection or other concerning signs are noted. Calcipotriene Counseling:  I discussed with the patient the risks of calcipotriene including but not limited to erythema, scaling, itching, and irritation. Rinvoq Counseling: I discussed with the patient the risks of Rinvoq therapy including but not limited to upper respiratory tract infections, shingles, cold sores, bronchitis, nausea, cough, fever, acne, and headache. Live vaccines should be avoided.  This medication has been linked to serious infections; higher rate of mortality; malignancy and lymphoproliferative disorders; major adverse cardiovascular events; thrombosis; thrombocytopenia, anemia, and neutropenia; lipid elevations; liver enzyme elevations; and gastrointestinal perforations. Tazorac Counseling:  Patient advised that medication is irritating and drying.  Patient may need to apply sparingly and wash off after an hour before eventually leaving it on overnight.  The patient verbalized understanding of the proper use and possible adverse effects of tazorac.  All of the patient's questions and concerns were addressed. Azathioprine Pregnancy And Lactation Text: This medication is Pregnancy Category D and isn't considered safe during pregnancy. It is unknown if this medication is excreted in breast milk. Qbrexza Pregnancy And Lactation Text: There is no available data on Qbrexza use in pregnant women.  There is no available data on Qbrexza use in lactation. Acitretin Pregnancy And Lactation Text: This medication is Pregnancy Category X and should not be given to women who are pregnant or may become pregnant in the future. This medication is excreted in breast milk. Mirvaso Counseling: Mirvaso is a topical medication which can decrease superficial blood flow where applied. Side effects are uncommon and include stinging, redness and allergic reactions. Niacinamide Counseling: I recommended taking niacin or niacinamide, also know as vitamin B3, twice daily. Recent evidence suggests that taking vitamin B3 (500 mg twice daily) can reduce the risk of actinic keratoses and non-melanoma skin cancers. Side effects of vitamin B3 include flushing and headache. Ivermectin Counseling:  Patient instructed to take medication on an empty stomach with a full glass of water.  Patient informed of potential adverse effects including but not limited to nausea, diarrhea, dizziness, itching, and swelling of the extremities or lymph nodes.  The patient verbalized understanding of the proper use and possible adverse effects of ivermectin.  All of the patient's questions and concerns were addressed. Tranexamic Acid Counseling:  Patient advised of the small risk of bleeding problems with tranexamic acid. They were also instructed to call if they developed any nausea, vomiting or diarrhea. All of the patient's questions and concerns were addressed. Cimetidine Counseling:  I discussed with the patient the risks of Cimetidine including but not limited to gynecomastia, headache, diarrhea, nausea, drowsiness, arrhythmias, pancreatitis, skin rashes, psychosis, bone marrow suppression and kidney toxicity. Prednisone Counseling:  I discussed with the patient the risks of prolonged use of prednisone including but not limited to weight gain, insomnia, osteoporosis, mood changes, diabetes, susceptibility to infection, glaucoma and high blood pressure.  In cases where prednisone use is prolonged, patients should be monitored with blood pressure checks, serum glucose levels and an eye exam.  Additionally, the patient may need to be placed on GI prophylaxis, PCP prophylaxis, and calcium and vitamin D supplementation and/or a bisphosphonate.  The patient verbalized understanding of the proper use and the possible adverse effects of prednisone.  All of the patient's questions and concerns were addressed. Otezla Pregnancy And Lactation Text: This medication is Pregnancy Category C and it isn't known if it is safe during pregnancy. It is unknown if it is excreted in breast milk. Skyrizi Counseling: I discussed with the patient the risks of risankizumab-rzaa including but not limited to immunosuppression, and serious infections.  The patient understands that monitoring is required including a PPD at baseline and must alert us or the primary physician if symptoms of infection or other concerning signs are noted. Dapsone Pregnancy And Lactation Text: This medication is Pregnancy Category C and is not considered safe during pregnancy or breast feeding. Finasteride Male Counseling: Finasteride Counseling:  I discussed with the patient the risks of use of finasteride including but not limited to decreased libido, decreased ejaculate volume, gynecomastia, and depression. Women should not handle medication.  All of the patient's questions and concerns were addressed. Bactrim Counseling:  I discussed with the patient the risks of sulfa antibiotics including but not limited to GI upset, allergic reaction, drug rash, diarrhea, dizziness, photosensitivity, and yeast infections.  Rarely, more serious reactions can occur including but not limited to aplastic anemia, agranulocytosis, methemoglobinemia, blood dyscrasias, liver or kidney failure, lung infiltrates or desquamative/blistering drug rashes. Calcipotriene Pregnancy And Lactation Text: The use of this medication during pregnancy or lactation is not recommended as there is insufficient data. Azelaic Acid Counseling: Patient counseled that medicine may cause skin irritation and to avoid applying near the eyes.  In the event of skin irritation, the patient was advised to reduce the amount of the drug applied or use it less frequently.   The patient verbalized understanding of the proper use and possible adverse effects of azelaic acid.  All of the patient's questions and concerns were addressed. Griseofulvin Counseling:  I discussed with the patient the risks of griseofulvin including but not limited to photosensitivity, cytopenia, liver damage, nausea/vomiting and severe allergy.  The patient understands that this medication is best absorbed when taken with a fatty meal (e.g., ice cream or french fries). Infliximab Counseling:  I discussed with the patient the risks of infliximab including but not limited to myelosuppression, immunosuppression, autoimmune hepatitis, demyelinating diseases, lymphoma, and serious infections.  The patient understands that monitoring is required including a PPD at baseline and must alert us or the primary physician if symptoms of infection or other concerning signs are noted. Doxycycline Pregnancy And Lactation Text: This medication is Pregnancy Category D and not consider safe during pregnancy. It is also excreted in breast milk but is considered safe for shorter treatment courses. Topical Sulfur Applications Counseling: Topical Sulfur Counseling: Patient counseled that this medication may cause skin irritation or allergic reactions.  In the event of skin irritation, the patient was advised to reduce the amount of the drug applied or use it less frequently.   The patient verbalized understanding of the proper use and possible adverse effects of topical sulfur application.  All of the patient's questions and concerns were addressed. Tazorac Pregnancy And Lactation Text: This medication is not safe during pregnancy. It is unknown if this medication is excreted in breast milk. Bexarotene Counseling:  I discussed with the patient the risks of bexarotene including but not limited to hair loss, dry lips/skin/eyes, liver abnormalities, hyperlipidemia, pancreatitis, depression/suicidal ideation, photosensitivity, drug rash/allergic reactions, hypothyroidism, anemia, leukopenia, infection, cataracts, and teratogenicity.  Patient understands that they will need regular blood tests to check lipid profile, liver function tests, white blood cell count, thyroid function tests and pregnancy test if applicable. Rinvoq Pregnancy And Lactation Text: Based on animal studies, Rinvoq may cause embryo-fetal harm when administered to pregnant women.  The medication should not be used in pregnancy.  Breastfeeding is not recommended during treatment and for 6 days after the last dose. Cantharidin Counseling:  I discussed with the patient the risks of Cantharidin including but not limited to pain, redness, burning, itching, and blistering. Rhofade Counseling: Rhofade is a topical medication which can decrease superficial blood flow where applied. Side effects are uncommon and include stinging, redness and allergic reactions. Mirvaso Pregnancy And Lactation Text: This medication has not been assigned a Pregnancy Risk Category. It is unknown if the medication is excreted in breast milk. Cellcept Counseling:  I discussed with the patient the risks of mycophenolate mofetil including but not limited to infection/immunosuppression, GI upset, hypokalemia, hypercholesterolemia, bone marrow suppression, lymphoproliferative disorders, malignancy, GI ulceration/bleed/perforation, colitis, interstitial lung disease, kidney failure, progressive multifocal leukoencephalopathy, and birth defects.  The patient understands that monitoring is required including a baseline creatinine and regular CBC testing. In addition, patient must alert us immediately if symptoms of infection or other concerning signs are noted. Rifampin Counseling: I discussed with the patient the risks of rifampin including but not limited to liver damage, kidney damage, red-orange body fluids, nausea/vomiting and severe allergy. Oxybutynin Counseling:  I discussed with the patient the risks of oxybutynin including but not limited to skin rash, drowsiness, dry mouth, difficulty urinating, and blurred vision. Hydroquinone Counseling:  Patient advised that medication may result in skin irritation, lightening (hypopigmentation), dryness, and burning.  In the event of skin irritation, the patient was advised to reduce the amount of the drug applied or use it less frequently.  Rarely, spots that are treated with hydroquinone can become darker (pseudoochronosis).  Should this occur, patient instructed to stop medication and call the office. The patient verbalized understanding of the proper use and possible adverse effects of hydroquinone.  All of the patient's questions and concerns were addressed. Bactrim Pregnancy And Lactation Text: This medication is Pregnancy Category D and is known to cause fetal risk.  It is also excreted in breast milk. Tranexamic Acid Pregnancy And Lactation Text: It is unknown if this medication is safe during pregnancy or breast feeding. Finasteride Female Counseling: Finasteride Counseling:  I discussed with the patient the risks of use of finasteride including but not limited to decreased libido and sexual dysfunction. Explained the teratogenic nature of the medication and stressed the importance of not getting pregnant during treatment. All of the patient's questions and concerns were addressed. Gabapentin Counseling: I discussed with the patient the risks of gabapentin including but not limited to dizziness, somnolence, fatigue and ataxia. Niacinamide Pregnancy And Lactation Text: These medications are considered safe during pregnancy. Dapsone Counseling: I discussed with the patient the risks of dapsone including but not limited to hemolytic anemia, agranulocytosis, rashes, methemoglobinemia, kidney failure, peripheral neuropathy, headaches, GI upset, and liver toxicity.  Patients who start dapsone require monitoring including baseline LFTs and weekly CBCs for the first month, then every month thereafter.  The patient verbalized understanding of the proper use and possible adverse effects of dapsone.  All of the patient's questions and concerns were addressed. Cantharidin Pregnancy And Lactation Text: This medication has not been proven safe during pregnancy. It is unknown if this medication is excreted in breast milk. Griseofulvin Pregnancy And Lactation Text: This medication is Pregnancy Category X and is known to cause serious birth defects. It is unknown if this medication is excreted in breast milk but breast feeding should be avoided. Libtayo Counseling- I discussed with the patient the risks of Libtayo including but not limited to nausea, vomiting, diarrhea, and bone or muscle pain.  The patient verbalized understanding of the proper use and possible adverse effects of Libtayo.  All of the patient's questions and concerns were addressed. Erythromycin Counseling:  I discussed with the patient the risks of erythromycin including but not limited to GI upset, allergic reaction, drug rash, diarrhea, increase in liver enzymes, and yeast infections. Sotyktu Counseling:  I discussed the most common side effects of Sotyktu including: common cold, sore throat, sinus infections, cold sores, canker sores, folliculitis, and acne.? I also discussed more serious side effects of Sotyktu including but not limited to: serious allergic reactions; increased risk for infections such as TB; cancers such as lymphomas; rhabdomyolysis and elevated CPK; and elevated triglycerides and liver enzymes.? Zyclara Counseling:  I discussed with the patient the risks of imiquimod including but not limited to erythema, scaling, itching, weeping, crusting, and pain.  Patient understands that the inflammatory response to imiquimod is variable from person to person and was educated regarded proper titration schedule.  If flu-like symptoms develop, patient knows to discontinue the medication and contact us. Dupixent Counseling: I discussed with the patient the risks of dupilumab including but not limited to eye infection and irritation, cold sores, injection site reactions, worsening of asthma, allergic reactions and increased risk of parasitic infection.  Live vaccines should be avoided while taking dupilumab. Dupilumab will also interact with certain medications such as warfarin and cyclosporine. The patient understands that monitoring is required and they must alert us or the primary physician if symptoms of infection or other concerning signs are noted. Topical Sulfur Applications Pregnancy And Lactation Text: This medication is Pregnancy Category C and has an unknown safety profile during pregnancy. It is unknown if this topical medication is excreted in breast milk. Taltz Counseling: I discussed with the patient the risks of ixekizumab including but not limited to immunosuppression, serious infections, worsening of inflammatory bowel disease and drug reactions.  The patient understands that monitoring is required including a PPD at baseline and must alert us or the primary physician if symptoms of infection or other concerning signs are noted. 5-Fu Counseling: 5-Fluorouracil Counseling:  I discussed with the patient the risks of 5-fluorouracil including but not limited to erythema, scaling, itching, weeping, crusting, and pain. Topical Clindamycin Counseling: Patient counseled that this medication may cause skin irritation or allergic reactions.  In the event of skin irritation, the patient was advised to reduce the amount of the drug applied or use it less frequently.   The patient verbalized understanding of the proper use and possible adverse effects of clindamycin.  All of the patient's questions and concerns were addressed. Bexarotene Pregnancy And Lactation Text: This medication is Pregnancy Category X and should not be given to women who are pregnant or may become pregnant. This medication should not be used if you are breast feeding. Opzelura Counseling:  I discussed with the patient the risks of Opzelura including but not limited to nasopharngitis, bronchitis, ear infection, eosinophila, hives, diarrhea, folliculitis, tonsillitis, and rhinorrhea.  Taken orally, this medication has been linked to serious infections; higher rate of mortality; malignancy and lymphoproliferative disorders; major adverse cardiovascular events; thrombosis; thrombocytopenia, anemia, and neutropenia; and lipid elevations. Valtrex Counseling: I discussed with the patient the risks of valacyclovir including but not limited to kidney damage, nausea, vomiting and severe allergy.  The patient understands that if the infection seems to be worsening or is not improving, they are to call. Rifampin Pregnancy And Lactation Text: This medication is Pregnancy Category C and it isn't know if it is safe during pregnancy. It is also excreted in breast milk and should not be used if you are breast feeding. Finasteride Pregnancy And Lactation Text: This medication is absolutely contraindicated during pregnancy. It is unknown if it is excreted in breast milk. Nsaids Counseling: NSAID Counseling: I discussed with the patient that NSAIDs should be taken with food. Prolonged use of NSAIDs can result in the development of stomach ulcers.  Patient advised to stop taking NSAIDs if abdominal pain occurs.  The patient verbalized understanding of the proper use and possible adverse effects of NSAIDs.  All of the patient's questions and concerns were addressed. Doxepin Counseling:  Patient advised that the medication is sedating and not to drive a car after taking this medication. Patient informed of potential adverse effects including but not limited to dry mouth, urinary retention, and blurry vision.  The patient verbalized understanding of the proper use and possible adverse effects of doxepin.  All of the patient's questions and concerns were addressed. Cephalexin Counseling: I counseled the patient regarding use of cephalexin as an antibiotic for prophylactic and/or therapeutic purposes. Cephalexin (commonly prescribed under brand name Keflex) is a cephalosporin antibiotic which is active against numerous classes of bacteria, including most skin bacteria. Side effects may include nausea, diarrhea, gastrointestinal upset, rash, hives, yeast infections, and in rare cases, hepatitis, kidney disease, seizures, fever, confusion, neurologic symptoms, and others. Patients with severe allergies to penicillin medications are cautioned that there is about a 10% incidence of cross-reactivity with cephalosporins. When possible, patients with penicillin allergies should use alternatives to cephalosporins for antibiotic therapy. Adbry Counseling: I discussed with the patient the risks of tralokinumab including but not limited to eye infection and irritation, cold sores, injection site reactions, worsening of asthma, allergic reactions and increased risk of parasitic infection.  Live vaccines should be avoided while taking tralokinumab. The patient understands that monitoring is required and they must alert us or the primary physician if symptoms of infection or other concerning signs are noted. Libtayo Pregnancy And Lactation Text: This medication is contraindicated in pregnancy and when breast feeding. Itraconazole Counseling:  I discussed with the patient the risks of itraconazole including but not limited to liver damage, nausea/vomiting, neuropathy, and severe allergy.  The patient understands that this medication is best absorbed when taken with acidic beverages such as non-diet cola or ginger ale.  The patient understands that monitoring is required including baseline LFTs and repeat LFTs at intervals.  The patient understands that they are to contact us or the primary physician if concerning signs are noted. Sotyktu Pregnancy And Lactation Text: There is insufficient data to evaluate whether or not Sotyktu is safe to use during pregnancy.? ?It is not known if Sotyktu passes into breast milk and whether or not it is safe to use when breastfeeding.?? Cibinqo Counseling: I discussed with the patient the risks of Cibinqo therapy including but not limited to common cold, nausea, headache, cold sores, increased blood CPK levels, dizziness, UTIs, fatigue, acne, and vomitting. Live vaccines should be avoided.  This medication has been linked to serious infections; higher rate of mortality; malignancy and lymphoproliferative disorders; major adverse cardiovascular events; thrombosis; thrombocytopenia and lymphopenia; lipid elevations; and retinal detachment. Solaraze Counseling:  I discussed with the patient the risks of Solaraze including but not limited to erythema, scaling, itching, weeping, crusting, and pain. Imiquimod Counseling:  I discussed with the patient the risks of imiquimod including but not limited to erythema, scaling, itching, weeping, crusting, and pain.  Patient understands that the inflammatory response to imiquimod is variable from person to person and was educated regarded proper titration schedule.  If flu-like symptoms develop, patient knows to discontinue the medication and contact us. Erythromycin Pregnancy And Lactation Text: This medication is Pregnancy Category B and is considered safe during pregnancy. It is also excreted in breast milk. Rituxan Counseling:  I discussed with the patient the risks of Rituxan infusions. Side effects can include infusion reactions, severe drug rashes including mucocutaneous reactions, reactivation of latent hepatitis and other infections and rarely progressive multifocal leukoencephalopathy.  All of the patient's questions and concerns were addressed. Opzelura Pregnancy And Lactation Text: There is insufficient data to evaluate drug-associated risk for major birth defects, miscarriage, or other adverse maternal or fetal outcomes.  There is a pregnancy registry that monitors pregnancy outcomes in pregnant persons exposed to the medication during pregnancy.  It is unknown if this medication is excreted in breast milk.  Do not breastfeed during treatment and for about 4 weeks after the last dose. Dupixent Pregnancy And Lactation Text: This medication likely crosses the placenta but the risk for the fetus is uncertain. This medication is excreted in breast milk. Cyclophosphamide Counseling:  I discussed with the patient the risks of cyclophosphamide including but not limited to hair loss, hormonal abnormalities, decreased fertility, abdominal pain, diarrhea, nausea and vomiting, bone marrow suppression and infection. The patient understands that monitoring is required while taking this medication. Wartpeel Counseling:  I discussed with the patient the risks of Wartpeel including but not limited to erythema, scaling, itching, weeping, crusting, and pain. Opioid Pregnancy And Lactation Text: These medications can lead to premature delivery and should be avoided during pregnancy. These medications are also present in breast milk in small amounts. Valtrex Pregnancy And Lactation Text: this medication is Pregnancy Category B and is considered safe during pregnancy. This medication is not directly found in breast milk but it's metabolite acyclovir is present. Isotretinoin Counseling: Patient should get monthly blood tests, not donate blood, not drive at night if vision affected, not share medication, and not undergo elective surgery for 6 months after tx completed. Side effects reviewed, pt to contact office should one occur. Sarecycline Counseling: Patient advised regarding possible photosensitivity and discoloration of the teeth, skin, lips, tongue and gums.  Patient instructed to avoid sunlight, if possible.  When exposed to sunlight, patients should wear protective clothing, sunglasses, and sunscreen.  The patient was instructed to call the office immediately if the following severe adverse effects occur:  hearing changes, easy bruising/bleeding, severe headache, or vision changes.  The patient verbalized understanding of the proper use and possible adverse effects of sarecycline.  All of the patient's questions and concerns were addressed. Glycopyrrolate Counseling:  I discussed with the patient the risks of glycopyrrolate including but not limited to skin rash, drowsiness, dry mouth, difficulty urinating, and blurred vision. Hyrimoz Counseling:  I discussed with the patient the risks of adalimumab including but not limited to myelosuppression, immunosuppression, autoimmune hepatitis, demyelinating diseases, lymphoma, and serious infections.  The patient understands that monitoring is required including a PPD at baseline and must alert us or the primary physician if symptoms of infection or other concerning signs are noted.
